# Patient Record
Sex: FEMALE | Race: WHITE | NOT HISPANIC OR LATINO | Employment: OTHER | URBAN - METROPOLITAN AREA
[De-identification: names, ages, dates, MRNs, and addresses within clinical notes are randomized per-mention and may not be internally consistent; named-entity substitution may affect disease eponyms.]

---

## 2019-05-20 ENCOUNTER — HOSPITAL ENCOUNTER (EMERGENCY)
Facility: HOSPITAL | Age: 79
Discharge: HOME | End: 2019-05-20
Attending: EMERGENCY MEDICINE
Payer: MEDICARE

## 2019-05-20 VITALS
SYSTOLIC BLOOD PRESSURE: 165 MMHG | HEIGHT: 67 IN | TEMPERATURE: 98 F | DIASTOLIC BLOOD PRESSURE: 82 MMHG | HEART RATE: 71 BPM | RESPIRATION RATE: 18 BRPM | BODY MASS INDEX: 23.23 KG/M2 | WEIGHT: 148 LBS | OXYGEN SATURATION: 99 %

## 2019-05-20 DIAGNOSIS — M54.9 BACK PAIN, UNSPECIFIED BACK LOCATION, UNSPECIFIED BACK PAIN LATERALITY, UNSPECIFIED CHRONICITY: ICD-10-CM

## 2019-05-20 DIAGNOSIS — K59.00 CONSTIPATION, UNSPECIFIED CONSTIPATION TYPE: ICD-10-CM

## 2019-05-20 DIAGNOSIS — R07.9 CHEST PAIN: ICD-10-CM

## 2019-05-20 DIAGNOSIS — R10.13 EPIGASTRIC PAIN: Primary | ICD-10-CM

## 2019-05-20 LAB
ALBUMIN SERPL BCP-MCNC: 3.7 G/DL (ref 3.5–5.2)
ALP SERPL-CCNC: 79 U/L (ref 55–135)
ALT SERPL W/O P-5'-P-CCNC: 11 U/L (ref 10–44)
ANION GAP SERPL CALC-SCNC: 7 MMOL/L (ref 8–16)
AST SERPL-CCNC: 14 U/L (ref 10–40)
BASOPHILS # BLD AUTO: 0.04 K/UL (ref 0–0.2)
BASOPHILS NFR BLD: 0.5 % (ref 0–1.9)
BILIRUB SERPL-MCNC: 0.5 MG/DL (ref 0.1–1)
BNP SERPL-MCNC: 97 PG/ML (ref 0–99)
BUN SERPL-MCNC: 11 MG/DL (ref 8–23)
CALCIUM SERPL-MCNC: 8.9 MG/DL (ref 8.7–10.5)
CHLORIDE SERPL-SCNC: 105 MMOL/L (ref 95–110)
CO2 SERPL-SCNC: 21 MMOL/L (ref 23–29)
CREAT SERPL-MCNC: 0.8 MG/DL (ref 0.5–1.4)
DIFFERENTIAL METHOD: ABNORMAL
EOSINOPHIL # BLD AUTO: 0.1 K/UL (ref 0–0.5)
EOSINOPHIL NFR BLD: 1.3 % (ref 0–8)
ERYTHROCYTE [DISTWIDTH] IN BLOOD BY AUTOMATED COUNT: 15.5 % (ref 11.5–14.5)
EST. GFR  (AFRICAN AMERICAN): >60 ML/MIN/1.73 M^2
EST. GFR  (NON AFRICAN AMERICAN): >60 ML/MIN/1.73 M^2
GLUCOSE SERPL-MCNC: 164 MG/DL (ref 70–110)
HCT VFR BLD AUTO: 39.4 % (ref 37–48.5)
HGB BLD-MCNC: 12.6 G/DL (ref 12–16)
IMM GRANULOCYTES # BLD AUTO: 0.04 K/UL (ref 0–0.04)
IMM GRANULOCYTES NFR BLD AUTO: 0.5 % (ref 0–0.5)
LYMPHOCYTES # BLD AUTO: 1.1 K/UL (ref 1–4.8)
LYMPHOCYTES NFR BLD: 12.7 % (ref 18–48)
MCH RBC QN AUTO: 27.3 PG (ref 27–31)
MCHC RBC AUTO-ENTMCNC: 32 G/DL (ref 32–36)
MCV RBC AUTO: 86 FL (ref 82–98)
MONOCYTES # BLD AUTO: 0.6 K/UL (ref 0.3–1)
MONOCYTES NFR BLD: 6.5 % (ref 4–15)
NEUTROPHILS # BLD AUTO: 6.6 K/UL (ref 1.8–7.7)
NEUTROPHILS NFR BLD: 78.5 % (ref 38–73)
NRBC BLD-RTO: 0 /100 WBC
PLATELET # BLD AUTO: 229 K/UL (ref 150–350)
PMV BLD AUTO: 10.9 FL (ref 9.2–12.9)
POTASSIUM SERPL-SCNC: 3.6 MMOL/L (ref 3.5–5.1)
PROT SERPL-MCNC: 7.4 G/DL (ref 6–8.4)
RBC # BLD AUTO: 4.61 M/UL (ref 4–5.4)
SODIUM SERPL-SCNC: 133 MMOL/L (ref 136–145)
TROPONIN I SERPL DL<=0.01 NG/ML-MCNC: <0.01 NG/ML (ref 0.02–0.5)
WBC # BLD AUTO: 8.44 K/UL (ref 3.9–12.7)

## 2019-05-20 PROCEDURE — 85025 COMPLETE CBC W/AUTO DIFF WBC: CPT

## 2019-05-20 PROCEDURE — 71045 XR CHEST 1 VIEW: ICD-10-PCS | Mod: 26,,, | Performed by: RADIOLOGY

## 2019-05-20 PROCEDURE — 71045 X-RAY EXAM CHEST 1 VIEW: CPT | Mod: TC,FY

## 2019-05-20 PROCEDURE — 80053 COMPREHEN METABOLIC PANEL: CPT

## 2019-05-20 PROCEDURE — 93005 ELECTROCARDIOGRAM TRACING: CPT

## 2019-05-20 PROCEDURE — 84484 ASSAY OF TROPONIN QUANT: CPT

## 2019-05-20 PROCEDURE — 71045 X-RAY EXAM CHEST 1 VIEW: CPT | Mod: 26,,, | Performed by: RADIOLOGY

## 2019-05-20 PROCEDURE — 99285 EMERGENCY DEPT VISIT HI MDM: CPT | Mod: 25

## 2019-05-20 PROCEDURE — 83880 ASSAY OF NATRIURETIC PEPTIDE: CPT

## 2019-05-20 RX ORDER — ASPIRIN 325 MG
325 TABLET ORAL
Status: DISCONTINUED | OUTPATIENT
Start: 2019-05-20 | End: 2019-05-20

## 2019-05-20 RX ORDER — ROSUVASTATIN CALCIUM 5 MG/1
5 TABLET, COATED ORAL EVERY OTHER DAY
COMMUNITY

## 2019-05-20 RX ORDER — NITROFURANTOIN MACROCRYSTALS 50 MG/1
50 CAPSULE ORAL EVERY OTHER DAY
COMMUNITY
End: 2023-05-15

## 2019-05-20 RX ORDER — METOPROLOL SUCCINATE 50 MG/1
50 TABLET, EXTENDED RELEASE ORAL DAILY
COMMUNITY

## 2019-05-20 RX ORDER — CHOLECALCIFEROL (VITAMIN D3) 25 MCG
2000 TABLET ORAL DAILY
COMMUNITY

## 2019-05-20 NOTE — ED PROVIDER NOTES
"Encounter Date: 5/20/2019       History     Chief Complaint   Patient presents with    Chest Pain     79yo female with pmh known/reported RBBB, HTN, and HLD presents to ED for evaluation of intermittent chest pain that is located "just under my boobs" and radiating to the mid-back that began around 2am. Reports she took Tums with no relief so then took two 81mg ASA. Does report history of constipation with last bowel movement approximately 2-3 days ago. Denies fever, chills, dyspnea, palpitations, edema.         Review of patient's allergies indicates:   Allergen Reactions    Pcn [penicillins]     Prednisone (bulk)     Sulfa (sulfonamide antibiotics)      No past medical history on file.  No past surgical history on file.  No family history on file.  Social History     Tobacco Use    Smoking status: Not on file   Substance Use Topics    Alcohol use: Not on file    Drug use: Not on file     Review of Systems   Constitutional: Negative for chills, diaphoresis, fatigue and fever.   HENT: Negative for congestion, ear pain, facial swelling, rhinorrhea, sinus pressure, sinus pain, sore throat and tinnitus.    Eyes: Negative for photophobia and visual disturbance.   Respiratory: Negative for cough, chest tightness, shortness of breath and wheezing.    Cardiovascular: Positive for chest pain. Negative for palpitations and leg swelling.   Gastrointestinal: Negative for abdominal distention, abdominal pain, blood in stool, constipation, diarrhea, nausea and vomiting.   Endocrine: Negative for cold intolerance, heat intolerance, polydipsia, polyphagia and polyuria.   Genitourinary: Negative for decreased urine volume, difficulty urinating, dysuria, flank pain, frequency, hematuria, pelvic pain and urgency.   Musculoskeletal: Positive for back pain. Negative for arthralgias, gait problem, joint swelling, myalgias, neck pain and neck stiffness.   Skin: Negative for color change, pallor, rash and wound. "   Allergic/Immunologic: Negative for immunocompromised state.   Neurological: Negative for dizziness, syncope, weakness, light-headedness, numbness and headaches.   Hematological: Negative for adenopathy. Does not bruise/bleed easily.   Psychiatric/Behavioral: Negative for agitation, confusion and dysphoric mood.   All other systems reviewed and are negative.      Physical Exam     Initial Vitals [05/20/19 0600]   BP Pulse Resp Temp SpO2   (!) 165/82 71 18 98.1 °F (36.7 °C) 99 %      MAP       --         Physical Exam    Nursing note and vitals reviewed.  Constitutional: She appears well-developed and well-nourished. She is not diaphoretic. No distress.   HENT:   Head: Normocephalic and atraumatic.   Right Ear: External ear normal.   Left Ear: External ear normal.   Nose: Nose normal.   Mouth/Throat: Oropharynx is clear and moist.   Eyes: Conjunctivae are normal. Pupils are equal, round, and reactive to light. No scleral icterus.   Neck: Normal range of motion. Neck supple. No JVD present.   Cardiovascular: Normal rate, regular rhythm, normal heart sounds and intact distal pulses.   Pulmonary/Chest: Breath sounds normal. No respiratory distress. She has no wheezes. She has no rhonchi. She has no rales. She exhibits tenderness.   Abdominal: Soft. Bowel sounds are normal. She exhibits no distension. There is no tenderness. There is no rebound and no guarding.   Musculoskeletal: Normal range of motion. She exhibits no edema or tenderness.   Lymphadenopathy:     She has no cervical adenopathy.   Neurological: She is alert and oriented to person, place, and time. GCS score is 15. GCS eye subscore is 4. GCS verbal subscore is 5. GCS motor subscore is 6.   Skin: Skin is warm and dry. Capillary refill takes less than 2 seconds. No rash noted. No erythema.   Psychiatric: She has a normal mood and affect. Her behavior is normal. Judgment and thought content normal.         ED Course   Procedures  Labs Reviewed   CBC W/ AUTO  DIFFERENTIAL - Abnormal; Notable for the following components:       Result Value    RDW 15.5 (*)     Gran% 78.5 (*)     Lymph% 12.7 (*)     All other components within normal limits   COMPREHENSIVE METABOLIC PANEL - Abnormal; Notable for the following components:    Sodium 133 (*)     CO2 21 (*)     Glucose 164 (*)     Anion Gap 7 (*)     All other components within normal limits   TROPONIN I   B-TYPE NATRIURETIC PEPTIDE     EKG Readings: (Independently Interpreted)   Initial Reading: No STEMI. Rhythm: Normal Sinus Rhythm. Heart Rate: 73. Ectopy: No Ectopy. Conduction: RBBB. ST Segments: Normal ST Segments. T Waves: Normal. Axis: Normal. Clinical Impression: Normal Sinus Rhythm with RBBB       Imaging Results          X-Ray Chest 1 View (Final result)  Result time 05/20/19 07:51:48    Final result by Reynaldo Wilson MD (05/20/19 07:51:48)                 Impression:      1. No acute chest disease.  2. Cardiomegaly.      Electronically signed by: Reynaldo Wilson  Date:    05/20/2019  Time:    07:51             Narrative:    EXAMINATION:  XR CHEST 1 VIEW    CLINICAL HISTORY:  . Chest pain, unspecified    TECHNIQUE:  Single frontal portable view of the chest was performed.    COMPARISON:  None    FINDINGS:  Support devices: None    The lungs are clear, with normal appearance of pulmonary vasculature and no pleural effusion or pneumothorax.    The cardiac silhouette is enlarged.  The hilar and mediastinal contours are unremarkable.    Bones are intact.                              X-Rays:   Independently Interpreted Readings:   Chest X-Ray: No infiltrates.  No acute abnormalities. Cardiomegaly present.     Medical Decision Making:   Differential Diagnosis:   Myocardial infarction, pneumonia, pleurisy, costochondritis, GERD, gastritis  ED Management:  Cardiac workup negative, known RBBB seen on EKG. Pt is from out [facility] - and is returning today with her , requesting discharge to follow up with her  pcp.                       Clinical Impression:       ICD-10-CM ICD-9-CM   1. Epigastric pain R10.13 789.06   2. Chest pain R07.9 786.50   3. Back pain, unspecified back location, unspecified back pain laterality, unspecified chronicity M54.9 724.5   4. Constipation, unspecified constipation type K59.00 564.00         Disposition:   Disposition: Discharged  Condition: Stable                        Kelly Rojas MD  05/25/19 6809

## 2020-10-07 ENCOUNTER — HOSPITAL ENCOUNTER (EMERGENCY)
Facility: HOSPITAL | Age: 80
Discharge: HOME | End: 2020-10-08
Attending: FAMILY MEDICINE
Payer: MEDICARE

## 2020-10-07 DIAGNOSIS — R10.13 EPIGASTRIC ABDOMINAL PAIN: Primary | ICD-10-CM

## 2020-10-07 DIAGNOSIS — R10.13 EPIGASTRIC PAIN: ICD-10-CM

## 2020-10-07 LAB — POCT GLUCOSE: 169 MG/DL (ref 70–110)

## 2020-10-07 PROCEDURE — 83735 ASSAY OF MAGNESIUM: CPT

## 2020-10-07 PROCEDURE — 80053 COMPREHEN METABOLIC PANEL: CPT

## 2020-10-07 PROCEDURE — 82550 ASSAY OF CK (CPK): CPT

## 2020-10-07 PROCEDURE — 82553 CREATINE MB FRACTION: CPT

## 2020-10-07 PROCEDURE — 83690 ASSAY OF LIPASE: CPT

## 2020-10-07 PROCEDURE — 85025 COMPLETE CBC W/AUTO DIFF WBC: CPT

## 2020-10-07 PROCEDURE — 99285 EMERGENCY DEPT VISIT HI MDM: CPT | Mod: 25

## 2020-10-07 PROCEDURE — 82962 GLUCOSE BLOOD TEST: CPT

## 2020-10-07 PROCEDURE — 84484 ASSAY OF TROPONIN QUANT: CPT

## 2020-10-07 PROCEDURE — 93005 ELECTROCARDIOGRAM TRACING: CPT

## 2020-10-07 RX ORDER — FAMOTIDINE 20 MG/1
20 TABLET, FILM COATED ORAL 2 TIMES DAILY
COMMUNITY

## 2020-10-08 VITALS
BODY MASS INDEX: 23.16 KG/M2 | RESPIRATION RATE: 20 BRPM | HEIGHT: 65 IN | WEIGHT: 139 LBS | DIASTOLIC BLOOD PRESSURE: 77 MMHG | OXYGEN SATURATION: 100 % | HEART RATE: 73 BPM | TEMPERATURE: 99 F | SYSTOLIC BLOOD PRESSURE: 149 MMHG

## 2020-10-08 LAB
ALBUMIN SERPL BCP-MCNC: 3.4 G/DL (ref 3.5–5.2)
ALP SERPL-CCNC: 69 U/L (ref 55–135)
ALT SERPL W/O P-5'-P-CCNC: 11 U/L (ref 10–44)
ANION GAP SERPL CALC-SCNC: 11 MMOL/L (ref 8–16)
AST SERPL-CCNC: 12 U/L (ref 10–40)
BASOPHILS # BLD AUTO: 0.04 K/UL (ref 0–0.2)
BASOPHILS NFR BLD: 0.5 % (ref 0–1.9)
BILIRUB SERPL-MCNC: 0.4 MG/DL (ref 0.1–1)
BUN SERPL-MCNC: 16 MG/DL (ref 8–23)
CALCIUM SERPL-MCNC: 9.1 MG/DL (ref 8.7–10.5)
CHLORIDE SERPL-SCNC: 101 MMOL/L (ref 95–110)
CK MB SERPL-MCNC: 0.6 NG/ML (ref 0.1–6.5)
CK MB SERPL-RTO: 2.1 % (ref 0–5)
CK SERPL-CCNC: 29 U/L (ref 20–180)
CK SERPL-CCNC: 29 U/L (ref 20–180)
CO2 SERPL-SCNC: 24 MMOL/L (ref 23–29)
CREAT SERPL-MCNC: 0.8 MG/DL (ref 0.5–1.4)
DIFFERENTIAL METHOD: ABNORMAL
EOSINOPHIL # BLD AUTO: 0.1 K/UL (ref 0–0.5)
EOSINOPHIL NFR BLD: 1.5 % (ref 0–8)
ERYTHROCYTE [DISTWIDTH] IN BLOOD BY AUTOMATED COUNT: 14.9 % (ref 11.5–14.5)
EST. GFR  (AFRICAN AMERICAN): >60 ML/MIN/1.73 M^2
EST. GFR  (NON AFRICAN AMERICAN): >60 ML/MIN/1.73 M^2
GLUCOSE SERPL-MCNC: 144 MG/DL (ref 70–110)
HCT VFR BLD AUTO: 37.4 % (ref 37–48.5)
HGB BLD-MCNC: 11.8 G/DL (ref 12–16)
IMM GRANULOCYTES # BLD AUTO: 0.03 K/UL (ref 0–0.04)
IMM GRANULOCYTES NFR BLD AUTO: 0.3 % (ref 0–0.5)
LIPASE SERPL-CCNC: 56 U/L (ref 4–60)
LYMPHOCYTES # BLD AUTO: 1.7 K/UL (ref 1–4.8)
LYMPHOCYTES NFR BLD: 19.3 % (ref 18–48)
MAGNESIUM SERPL-MCNC: 1.8 MG/DL (ref 1.6–2.6)
MCH RBC QN AUTO: 27 PG (ref 27–31)
MCHC RBC AUTO-ENTMCNC: 31.6 G/DL (ref 32–36)
MCV RBC AUTO: 86 FL (ref 82–98)
MONOCYTES # BLD AUTO: 0.7 K/UL (ref 0.3–1)
MONOCYTES NFR BLD: 8 % (ref 4–15)
NEUTROPHILS # BLD AUTO: 6.3 K/UL (ref 1.8–7.7)
NEUTROPHILS NFR BLD: 70.4 % (ref 38–73)
NRBC BLD-RTO: 0 /100 WBC
PLATELET # BLD AUTO: 238 K/UL (ref 150–350)
PMV BLD AUTO: 11.3 FL (ref 9.2–12.9)
POTASSIUM SERPL-SCNC: 4 MMOL/L (ref 3.5–5.1)
PROT SERPL-MCNC: 7.3 G/DL (ref 6–8.4)
RBC # BLD AUTO: 4.37 M/UL (ref 4–5.4)
SODIUM SERPL-SCNC: 136 MMOL/L (ref 136–145)
TROPONIN I SERPL DL<=0.01 NG/ML-MCNC: 0.01 NG/ML (ref 0.02–0.5)
WBC # BLD AUTO: 8.88 K/UL (ref 3.9–12.7)

## 2020-10-08 PROCEDURE — 25000003 PHARM REV CODE 250: Performed by: FAMILY MEDICINE

## 2020-10-08 RX ORDER — DICYCLOMINE HYDROCHLORIDE 10 MG/1
20 CAPSULE ORAL
Status: COMPLETED | OUTPATIENT
Start: 2020-10-08 | End: 2020-10-08

## 2020-10-08 RX ORDER — HYOSCYAMINE SULFATE 0.12 MG/1
0.12 TABLET SUBLINGUAL EVERY 4 HOURS PRN
Qty: 30 TABLET | Refills: 0 | OUTPATIENT
Start: 2020-10-08 | End: 2023-07-04

## 2020-10-08 RX ADMIN — DICYCLOMINE HYDROCHLORIDE 20 MG: 10 CAPSULE ORAL at 01:10

## 2020-10-08 NOTE — ED PROVIDER NOTES
Encounter Date: 10/7/2020       History     Chief Complaint   Patient presents with    Abdominal Pain     epigastric pain that started after dinner per patient. patient reports pain radiates to her back.      Patient comes our facility complaining of some epigastric pain.  Patient stated that about 6:00 p.m. patient started having this pain.  It started about 1 hr after dinner.  Patient has some food with some red gravy in thinks the food might have upset her stomach.  The patient took 3 different times, 1 Pepcid, into 81 mg aspirin tabs had an effort to relieve her pain.  Patient had minimal to no relief and came to the ER.  Patient denies any associated shortness of breath, cough, fevers, chills, nausea, vomiting, diarrhea.  Patient had a normal bowel movement earlier today.  Patient denies any palpitations, diaphoresis or chest pains.  Patient denies any burning sensation or history of reflux in the past.        Review of patient's allergies indicates:   Allergen Reactions    Pcn [penicillins]     Prednisone (bulk)     Sulfa (sulfonamide antibiotics)      Past Medical History:   Diagnosis Date    Diabetes mellitus     PVC (premature ventricular contraction)      Past Surgical History:   Procedure Laterality Date    BREAST BIOPSY      HYSTERECTOMY       History reviewed. No pertinent family history.  Social History     Tobacco Use    Smoking status: Never Smoker    Smokeless tobacco: Never Used   Substance Use Topics    Alcohol use: Yes     Alcohol/week: 2.0 standard drinks     Types: 2 Glasses of wine per week     Comment: occasional     Drug use: Never     Review of Systems   Constitutional: Negative for chills and fever.   HENT: Negative for sore throat.    Respiratory: Negative for cough, shortness of breath and wheezing.    Cardiovascular: Negative for chest pain, palpitations and leg swelling.   Gastrointestinal: Positive for abdominal pain. Negative for abdominal distention, blood in stool,  constipation, diarrhea, nausea and vomiting.   Genitourinary: Negative for dysuria, flank pain and frequency.   Musculoskeletal: Negative for arthralgias and myalgias.   Skin: Negative for color change, pallor, rash and wound.   Neurological: Negative for dizziness, weakness, light-headedness and headaches.   Hematological: Negative for adenopathy. Does not bruise/bleed easily.   Psychiatric/Behavioral: Negative for agitation, behavioral problems and confusion.       Physical Exam     Initial Vitals [10/07/20 2315]   BP Pulse Resp Temp SpO2   (!) 157/78 77 18 98.6 °F (37 °C) 98 %      MAP       --         Physical Exam    Nursing note and vitals reviewed.  Constitutional: She appears well-developed and well-nourished. She is not diaphoretic. No distress.   HENT:   Head: Normocephalic and atraumatic.   Nose: Nose normal.   Eyes: Conjunctivae and EOM are normal. Right eye exhibits no discharge. Left eye exhibits no discharge. No scleral icterus.   Neck: Normal range of motion. Neck supple.   Cardiovascular: Normal rate, regular rhythm, normal heart sounds and intact distal pulses.   No murmur heard.  Pulmonary/Chest: Breath sounds normal. No respiratory distress. She has no wheezes. She has no rhonchi. She has no rales. She exhibits no tenderness.   Abdominal: Soft. Bowel sounds are normal. She exhibits no distension and no mass. There is no abdominal tenderness. There is no rebound.   Musculoskeletal: Normal range of motion. No edema.   Lymphadenopathy:     She has no cervical adenopathy.   Neurological: She is alert and oriented to person, place, and time. GCS score is 15. GCS eye subscore is 4. GCS verbal subscore is 5. GCS motor subscore is 6.   Skin: Skin is warm and dry. Capillary refill takes less than 2 seconds. No rash and no abscess noted. No erythema. No pallor.   Psychiatric: She has a normal mood and affect. Her behavior is normal. Judgment and thought content normal.         ED Course   Procedures  Labs  Reviewed   CBC W/ AUTO DIFFERENTIAL - Abnormal; Notable for the following components:       Result Value    Hemoglobin 11.8 (*)     Mean Corpuscular Hemoglobin Conc 31.6 (*)     RDW 14.9 (*)     All other components within normal limits   COMPREHENSIVE METABOLIC PANEL - Abnormal; Notable for the following components:    Glucose 144 (*)     Albumin 3.4 (*)     All other components within normal limits   TROPONIN I - Abnormal; Notable for the following components:    Troponin I 0.01 (*)     All other components within normal limits   POCT GLUCOSE - Abnormal; Notable for the following components:    POCT Glucose 169 (*)     All other components within normal limits   MAGNESIUM   CK   CK-MB   LIPASE   POCT GLUCOSE MONITORING CONTINUOUS          Imaging Results          XR ABDOMEN, ACUTE 2 OR MORE VIEWS WITH CHEST (In process)               X-Rays:   Independently Interpreted Readings:   Other Readings:  Flattened upright abdominal x-ray shows no acute findings.  Normal gas pattern with stool noted in large intestines.  Chest x-ray shows no acute findings.                                Clinical Impression:     1- epigastric abdominal pain , resolved     Disposition:   Disposition: Discharged  Condition: Stable                          Shoaib Varela MD  10/08/20 0113

## 2020-10-08 NOTE — DISCHARGE INSTRUCTIONS
Follow-up with her primary care physician as needed.  If his symptoms return or not control with treatment he may return to ER for secondary evaluation.

## 2020-10-08 NOTE — ED TRIAGE NOTES
"Patient ambulatory with steady gait unassisted to ER#3 accompanied by spouse. Patient complains of epigastric pain that started after she ate dinner around 1830. Patient reports pain radiates to her back described as constant "gassy" feeling. Rates pain 6/10. Respirations even and unlabored. No acute distress noted. Denies any shortness of breath, nausea, vomiting, or diarrhea. Denies diaphoresis with onset of epigastric pain. Last bowel movement today 10/07/2020.  Patient reports she took two 81mg Aspirin and three tums prior to arrival.   "

## 2020-10-08 NOTE — ED NOTES
Patient and patient's spouse updated on plan of care. No questions or concerns at this time. Will continue to monitor.

## 2022-12-23 ENCOUNTER — OFFICE VISIT (OUTPATIENT)
Dept: URGENT CARE | Facility: CLINIC | Age: 82
End: 2022-12-23
Payer: MEDICARE

## 2022-12-23 VITALS
HEIGHT: 65 IN | HEART RATE: 70 BPM | OXYGEN SATURATION: 98 % | WEIGHT: 140 LBS | SYSTOLIC BLOOD PRESSURE: 112 MMHG | TEMPERATURE: 98 F | DIASTOLIC BLOOD PRESSURE: 62 MMHG | BODY MASS INDEX: 23.32 KG/M2

## 2022-12-23 DIAGNOSIS — U07.1 COVID-19 VIRUS DETECTED: ICD-10-CM

## 2022-12-23 DIAGNOSIS — U07.1 COVID: ICD-10-CM

## 2022-12-23 DIAGNOSIS — R05.9 COUGH, UNSPECIFIED TYPE: Primary | ICD-10-CM

## 2022-12-23 LAB
CTP QC/QA: YES
SARS-COV-2 AG RESP QL IA.RAPID: POSITIVE

## 2022-12-23 PROCEDURE — 87811 SARS CORONAVIRUS 2 ANTIGEN POCT, MANUAL READ: ICD-10-PCS | Mod: QW,CR,S$GLB, | Performed by: NURSE PRACTITIONER

## 2022-12-23 PROCEDURE — 99203 OFFICE O/P NEW LOW 30 MIN: CPT | Mod: CR,S$GLB,, | Performed by: NURSE PRACTITIONER

## 2022-12-23 PROCEDURE — 87811 SARS-COV-2 COVID19 W/OPTIC: CPT | Mod: QW,CR,S$GLB, | Performed by: NURSE PRACTITIONER

## 2022-12-23 PROCEDURE — 99203 PR OFFICE/OUTPT VISIT, NEW, LEVL III, 30-44 MIN: ICD-10-PCS | Mod: CR,S$GLB,, | Performed by: NURSE PRACTITIONER

## 2022-12-23 RX ORDER — PROMETHAZINE HYDROCHLORIDE AND DEXTROMETHORPHAN HYDROBROMIDE 6.25; 15 MG/5ML; MG/5ML
5 SYRUP ORAL EVERY 4 HOURS PRN
Qty: 120 ML | Refills: 0 | Status: SHIPPED | OUTPATIENT
Start: 2022-12-23 | End: 2023-01-02

## 2022-12-23 NOTE — PATIENT INSTRUCTIONS
You must understand that you've received an Urgent Care treatment only and that you may be released before all your medical problems are known or treated. You, the patient, will arrange for follow up care as instructed.  Follow up with your PCP or specialty clinic as directed in the next 1-2 weeks if not improved or as needed.  You can call [phone] to schedule an appointment with the appropriate provider.  If your condition worsens we recommend that you receive another evaluation at the emergency room immediately or contact your primary medical clinics after hours call service to discuss your concerns.  Please return here or go to the Emergency Department for any concerns or worsening of condition.    If you were prescribed a narcotic or controlled medication, do not drive or operate heavy equipment or machinery while taking these medications.    Your test was POSITIVE for COVID-19 (coronavirus).       Please isolate yourself at home.  You may leave home and/or return to work once the following conditions are met:    If you were not hospitalized and are not moderately to severely immunocompromised:   More than 5 days since symptoms first appeared AND  More than 24 hours fever free without medications AND  Symptoms are improving  Continue to wear a mask around others for 5 additional days.    If you were hospitalized OR are moderately to severely immunocompromised:  More than 20 days since symptoms first appeared  More than 24 hours fever free without medications  Symptoms have improved    If you had no symptoms but tested positive:  More than 5 days since the date of the first positive test (20 days if moderately to severely immunocompromised). If you develop symptoms, then use the guidelines above.  Continue to wear a mask around others for 5 additional days.      Contact Tracing    As one of the next steps, you will receive a call or text from the [place] Department of Health ([facility]) COVID-19 Tracing  Team. See the contact information below so you know not to ignore the health departments call. It is important that you contact them back immediately so they can help.      Contact Tracer Number:  [phone]  Caller ID for most carriers: [facility]     What is contact tracing?  Contact tracing is a process that helps identify everyone who has been in close contact with an infected person. Contact tracers let those people know they may have been exposed and guide them on next steps. Confidentiality is important for everyone; no one will be told who may have exposed them to the virus.  Your involvement is important. The more we know about where and how this virus is spreading, the better chance we have at stopping it from spreading further.  What does exposure mean?  Exposure means you have been within 6 feet for more than 15 minutes with a person who has or had COVID-19.  What kind of questions do the contact tracers ask?  A contact tracer will confirm your basic contact information including name, address, phone number, and next of kin, as well as asking about any symptoms you may have had. Theyll also ask you how you think you may have gotten sick, such as places where you may have been exposed to the virus, and people you were with. Those names will never be shared with anyone outside of that call, and will only be used to help trace and stop the spread of the virus.   I have privacy concerns. How will the state use my information?  Your privacy about your health is important. All calls are completed using call centers that use the appropriate health privacy protection measures (HIPAA compliance), meaning that your patient information is safe. No one will ever ask you any questions related to immigration status. Your health comes first.   Do I have to participate?  You do not have to participate, but we strongly encourage you to. Contact tracing can help us catch and control new outbreaks as theyre  developing to keep your friends and family safe.   What if I dont hear from anyone?  If you dont receive a call within 24 hours, you can call the number above right away to inquire about your status. That line is open from 8:00 am - 8:00 p.m., 7 days a week.  Contact tracing saves lives! Together, we have the power to beat this virus and keep our loved ones and neighbors safe.    For more information see CDC link below.      https://www.cdc.gov/coronavirus/2019-ncov/hcp/guidance-prevent-spread.html#precautions        Sources:  CDC, [place] Department [facility] and [facility]           Sincerely,     FARIHA Burt

## 2022-12-23 NOTE — PROGRESS NOTES
"  Subjective:       Patient ID: Stacie Hudson is a 82 y.o. female.    Vitals:  height is 5' 5" (1.651 m) and weight is 63.5 kg (140 lb). Her oral temperature is 98.4 °F (36.9 °C). Her blood pressure is 112/62 and her pulse is 70. Her oxygen saturation is 98%.     Chief Complaint: Cough    This is a 82 y.o. female who presents today with a chief complaint of  Patient presents with:  Cough and sinus congestion since yesterday.         Cough  This is a new problem. The current episode started yesterday. The problem has been gradually worsening. The problem occurs constantly. Associated symptoms include nasal congestion. She has tried nothing for the symptoms. The treatment provided no relief.     Respiratory:  Positive for cough.          Objective:      Physical Exam   Constitutional: She is oriented to person, place, and time. She appears well-developed. She is cooperative.  Non-toxic appearance. She does not appear ill. No distress.   HENT:   Head: Normocephalic and atraumatic.   Ears:   Right Ear: Hearing, tympanic membrane, external ear and ear canal normal.   Left Ear: Hearing, tympanic membrane, external ear and ear canal normal.   Nose: Purulent discharge present. No mucosal edema, rhinorrhea or nasal deformity. No epistaxis. Right sinus exhibits no maxillary sinus tenderness and no frontal sinus tenderness. Left sinus exhibits no maxillary sinus tenderness and no frontal sinus tenderness.   Mouth/Throat: Uvula is midline and mucous membranes are normal. No trismus in the jaw. Normal dentition. No uvula swelling. Posterior oropharyngeal edema and posterior oropharyngeal erythema present. No oropharyngeal exudate.   Eyes: Conjunctivae and lids are normal. No scleral icterus.   Neck: Trachea normal and phonation normal. Neck supple. No edema present. No erythema present. No neck rigidity present.   Cardiovascular: Normal rate, regular rhythm, normal heart sounds and normal pulses.   Pulmonary/Chest: Effort normal " and breath sounds normal. No respiratory distress. She has no decreased breath sounds. She has no rhonchi.   Abdominal: Normal appearance.   Musculoskeletal: Normal range of motion.         General: No deformity. Normal range of motion.   Neurological: She is alert and oriented to person, place, and time. She exhibits normal muscle tone. Coordination normal.   Skin: Skin is warm, dry, intact, not diaphoretic and not pale.   Psychiatric: Her speech is normal and behavior is normal. Judgment and thought content normal.   Nursing note and vitals reviewed.      Past medical history and current medications reviewed.     No disrtress but eill cover related to med history.   Results for orders placed or performed in visit on 12/23/22   SARS Coronavirus 2 Antigen, POCT Manual Read   Result Value Ref Range    SARS Coronavirus 2 Antigen Positive (A) Negative     Acceptable Yes        Assessment:           1. Cough, unspecified type    2. COVID              Plan:         Cough, unspecified type  -     SARS Coronavirus 2 Antigen, POCT Manual Read  -     promethazine-dextromethorphan (PROMETHAZINE-DM) 6.25-15 mg/5 mL Syrp; Take 5 mLs by mouth every 4 (four) hours as needed.  Dispense: 120 mL; Refill: 0  -     nirmatrelvir-ritonavir 300 mg (150 mg x 2)-100 mg copackaged tablets (EUA); Take 3 tablets by mouth 2 (two) times daily for 5 days. Each dose contains 2 nirmatrelvir (pink tablets) and 1 ritonavir (white tablet). Take all 3 tablets together  Dispense: 30 tablet; Refill: 0    COVID  -     promethazine-dextromethorphan (PROMETHAZINE-DM) 6.25-15 mg/5 mL Syrp; Take 5 mLs by mouth every 4 (four) hours as needed.  Dispense: 120 mL; Refill: 0  -     nirmatrelvir-ritonavir 300 mg (150 mg x 2)-100 mg copackaged tablets (EUA); Take 3 tablets by mouth 2 (two) times daily for 5 days. Each dose contains 2 nirmatrelvir (pink tablets) and 1 ritonavir (white tablet). Take all 3 tablets together  Dispense: 30 tablet; Refill:  0             Patient Instructions     You must understand that you've received an Urgent Care treatment only and that you may be released before all your medical problems are known or treated. You, the patient, will arrange for follow up care as instructed.  Follow up with your PCP or specialty clinic as directed in the next 1-2 weeks if not improved or as needed.  You can call [phone] to schedule an appointment with the appropriate provider.  If your condition worsens we recommend that you receive another evaluation at the emergency room immediately or contact your primary medical clinics after hours call service to discuss your concerns.  Please return here or go to the Emergency Department for any concerns or worsening of condition.    If you were prescribed a narcotic or controlled medication, do not drive or operate heavy equipment or machinery while taking these medications.    Your test was POSITIVE for COVID-19 (coronavirus).       Please isolate yourself at home.  You may leave home and/or return to work once the following conditions are met:    If you were not hospitalized and are not moderately to severely immunocompromised:   More than 5 days since symptoms first appeared AND  More than 24 hours fever free without medications AND  Symptoms are improving  Continue to wear a mask around others for 5 additional days.    If you were hospitalized OR are moderately to severely immunocompromised:  More than 20 days since symptoms first appeared  More than 24 hours fever free without medications  Symptoms have improved    If you had no symptoms but tested positive:  More than 5 days since the date of the first positive test (20 days if moderately to severely immunocompromised). If you develop symptoms, then use the guidelines above.  Continue to wear a mask around others for 5 additional days.      Contact Tracing    As one of the next steps, you will receive a call or text from the [facility]  of Health ([facility]) COVID-19 Tracing Team. See the contact information below so you know not to ignore the health departments call. It is important that you contact them back immediately so they can help.      Contact Tracer Number:  [phone]  Caller ID for most carriers: [facility]     What is contact tracing?  Contact tracing is a process that helps identify everyone who has been in close contact with an infected person. Contact tracers let those people know they may have been exposed and guide them on next steps. Confidentiality is important for everyone; no one will be told who may have exposed them to the virus.  Your involvement is important. The more we know about where and how this virus is spreading, the better chance we have at stopping it from spreading further.  What does exposure mean?  Exposure means you have been within 6 feet for more than 15 minutes with a person who has or had COVID-19.  What kind of questions do the contact tracers ask?  A contact tracer will confirm your basic contact information including name, address, phone number, and next of kin, as well as asking about any symptoms you may have had. Theyll also ask you how you think you may have gotten sick, such as places where you may have been exposed to the virus, and people you were with. Those names will never be shared with anyone outside of that call, and will only be used to help trace and stop the spread of the virus.   I have privacy concerns. How will the state use my information?  Your privacy about your health is important. All calls are completed using call centers that use the appropriate health privacy protection measures (HIPAA compliance), meaning that your patient information is safe. No one will ever ask you any questions related to immigration status. Your health comes first.   Do I have to participate?  You do not have to participate, but we strongly encourage you to. Contact tracing can help us catch and  control new outbreaks as theyre developing to keep your friends and family safe.   What if I dont hear from anyone?  If you dont receive a call within 24 hours, you can call the number above right away to inquire about your status. That line is open from 8:00 am - 8:00 p.m., 7 days a week.  Contact tracing saves lives! Together, we have the power to beat this virus and keep our loved ones and neighbors safe.    For more information see CDC link below.      https://www.cdc.gov/coronavirus/2019-ncov/hcp/guidance-prevent-spread.html#precautions        Sources:  [facility], [place] Department of Health and [facility]ists of hospitals in the [place]           Sincerely,     FARIHA Burt

## 2023-05-02 ENCOUNTER — HOSPITAL ENCOUNTER (EMERGENCY)
Facility: HOSPITAL | Age: 83
Discharge: HOME | End: 2023-05-03
Attending: FAMILY MEDICINE
Payer: MEDICARE

## 2023-05-02 DIAGNOSIS — R10.13 EPIGASTRIC DISCOMFORT: ICD-10-CM

## 2023-05-02 DIAGNOSIS — K80.20 SYMPTOMATIC CHOLELITHIASIS: ICD-10-CM

## 2023-05-02 DIAGNOSIS — K59.00 CONSTIPATION, UNSPECIFIED CONSTIPATION TYPE: ICD-10-CM

## 2023-05-02 DIAGNOSIS — N39.0 URINARY TRACT INFECTION WITHOUT HEMATURIA, SITE UNSPECIFIED: Primary | ICD-10-CM

## 2023-05-02 LAB
BASOPHILS # BLD AUTO: 0.03 K/UL (ref 0–0.2)
BASOPHILS NFR BLD: 0.3 % (ref 0–1.9)
DIFFERENTIAL METHOD: ABNORMAL
EOSINOPHIL # BLD AUTO: 0.1 K/UL (ref 0–0.5)
EOSINOPHIL NFR BLD: 1.4 % (ref 0–8)
ERYTHROCYTE [DISTWIDTH] IN BLOOD BY AUTOMATED COUNT: 15.8 % (ref 11.5–14.5)
HCT VFR BLD AUTO: 35.9 % (ref 37–48.5)
HGB BLD-MCNC: 11.8 G/DL (ref 12–16)
IMM GRANULOCYTES # BLD AUTO: 0.03 K/UL (ref 0–0.04)
IMM GRANULOCYTES NFR BLD AUTO: 0.3 % (ref 0–0.5)
LYMPHOCYTES # BLD AUTO: 1.6 K/UL (ref 1–4.8)
LYMPHOCYTES NFR BLD: 16.6 % (ref 18–48)
MCH RBC QN AUTO: 27.1 PG (ref 27–31)
MCHC RBC AUTO-ENTMCNC: 32.9 G/DL (ref 32–36)
MCV RBC AUTO: 82 FL (ref 82–98)
MONOCYTES # BLD AUTO: 0.9 K/UL (ref 0.3–1)
MONOCYTES NFR BLD: 9.1 % (ref 4–15)
NEUTROPHILS # BLD AUTO: 6.8 K/UL (ref 1.8–7.7)
NEUTROPHILS NFR BLD: 72.3 % (ref 38–73)
NRBC BLD-RTO: 0 /100 WBC
PLATELET # BLD AUTO: 245 K/UL (ref 150–450)
PMV BLD AUTO: 10.5 FL (ref 9.2–12.9)
RBC # BLD AUTO: 4.36 M/UL (ref 4–5.4)
WBC # BLD AUTO: 9.43 K/UL (ref 3.9–12.7)

## 2023-05-02 PROCEDURE — 85025 COMPLETE CBC W/AUTO DIFF WBC: CPT | Performed by: FAMILY MEDICINE

## 2023-05-02 PROCEDURE — 83690 ASSAY OF LIPASE: CPT | Performed by: FAMILY MEDICINE

## 2023-05-02 PROCEDURE — 93005 ELECTROCARDIOGRAM TRACING: CPT

## 2023-05-02 PROCEDURE — 93010 ELECTROCARDIOGRAM REPORT: CPT | Mod: ,,, | Performed by: INTERNAL MEDICINE

## 2023-05-02 PROCEDURE — 96361 HYDRATE IV INFUSION ADD-ON: CPT

## 2023-05-02 PROCEDURE — 25000003 PHARM REV CODE 250: Performed by: FAMILY MEDICINE

## 2023-05-02 PROCEDURE — 99285 EMERGENCY DEPT VISIT HI MDM: CPT | Mod: 25

## 2023-05-02 PROCEDURE — 80053 COMPREHEN METABOLIC PANEL: CPT | Performed by: FAMILY MEDICINE

## 2023-05-02 PROCEDURE — 93010 EKG 12-LEAD: ICD-10-PCS | Mod: ,,, | Performed by: INTERNAL MEDICINE

## 2023-05-02 RX ADMIN — SODIUM CHLORIDE 500 ML: 0.9 INJECTION, SOLUTION INTRAVENOUS at 11:05

## 2023-05-03 VITALS
OXYGEN SATURATION: 99 % | RESPIRATION RATE: 18 BRPM | TEMPERATURE: 98 F | BODY MASS INDEX: 22.49 KG/M2 | HEIGHT: 65 IN | WEIGHT: 135 LBS | HEART RATE: 81 BPM | DIASTOLIC BLOOD PRESSURE: 61 MMHG | SYSTOLIC BLOOD PRESSURE: 138 MMHG

## 2023-05-03 LAB
ALBUMIN SERPL BCP-MCNC: 3.6 G/DL (ref 3.5–5.2)
ALP SERPL-CCNC: 90 U/L (ref 55–135)
ALT SERPL W/O P-5'-P-CCNC: 9 U/L (ref 10–44)
ANION GAP SERPL CALC-SCNC: 14 MMOL/L (ref 8–16)
AST SERPL-CCNC: 15 U/L (ref 10–40)
BACTERIA #/AREA URNS HPF: ABNORMAL /HPF
BILIRUB SERPL-MCNC: 0.2 MG/DL (ref 0.1–1)
BILIRUB UR QL STRIP: NEGATIVE
BUN SERPL-MCNC: 14 MG/DL (ref 8–23)
CALCIUM SERPL-MCNC: 9.4 MG/DL (ref 8.7–10.5)
CHLORIDE SERPL-SCNC: 103 MMOL/L (ref 95–110)
CLARITY UR: CLEAR
CO2 SERPL-SCNC: 20 MMOL/L (ref 23–29)
COLOR UR: YELLOW
CREAT SERPL-MCNC: 1.1 MG/DL (ref 0.5–1.4)
EST. GFR  (NO RACE VARIABLE): 49.9 ML/MIN/1.73 M^2
GLUCOSE SERPL-MCNC: 132 MG/DL (ref 70–110)
GLUCOSE UR QL STRIP: NEGATIVE
HGB UR QL STRIP: ABNORMAL
KETONES UR QL STRIP: ABNORMAL
LEUKOCYTE ESTERASE UR QL STRIP: ABNORMAL
LIPASE SERPL-CCNC: 95 U/L (ref 4–60)
MICROSCOPIC COMMENT: ABNORMAL
NITRITE UR QL STRIP: NEGATIVE
PH UR STRIP: 6 [PH] (ref 5–8)
POTASSIUM SERPL-SCNC: 3.8 MMOL/L (ref 3.5–5.1)
PROT SERPL-MCNC: 7.4 G/DL (ref 6–8.4)
PROT UR QL STRIP: NEGATIVE
RBC #/AREA URNS HPF: 6 /HPF (ref 0–4)
SODIUM SERPL-SCNC: 137 MMOL/L (ref 136–145)
SP GR UR STRIP: 1.02 (ref 1–1.03)
SQUAMOUS #/AREA URNS HPF: 3 /HPF
URN SPEC COLLECT METH UR: ABNORMAL
UROBILINOGEN UR STRIP-ACNC: NEGATIVE EU/DL
WBC #/AREA URNS HPF: 15 /HPF (ref 0–5)

## 2023-05-03 PROCEDURE — 96375 TX/PRO/DX INJ NEW DRUG ADDON: CPT

## 2023-05-03 PROCEDURE — 87186 SC STD MICRODIL/AGAR DIL: CPT | Performed by: FAMILY MEDICINE

## 2023-05-03 PROCEDURE — 25000003 PHARM REV CODE 250: Performed by: FAMILY MEDICINE

## 2023-05-03 PROCEDURE — 25500020 PHARM REV CODE 255: Performed by: FAMILY MEDICINE

## 2023-05-03 PROCEDURE — 63600175 PHARM REV CODE 636 W HCPCS: Performed by: FAMILY MEDICINE

## 2023-05-03 PROCEDURE — 87077 CULTURE AEROBIC IDENTIFY: CPT | Performed by: FAMILY MEDICINE

## 2023-05-03 PROCEDURE — 96365 THER/PROPH/DIAG IV INF INIT: CPT

## 2023-05-03 PROCEDURE — 87086 URINE CULTURE/COLONY COUNT: CPT | Performed by: FAMILY MEDICINE

## 2023-05-03 PROCEDURE — 81000 URINALYSIS NONAUTO W/SCOPE: CPT | Performed by: FAMILY MEDICINE

## 2023-05-03 PROCEDURE — 87088 URINE BACTERIA CULTURE: CPT | Performed by: FAMILY MEDICINE

## 2023-05-03 RX ORDER — ACETAMINOPHEN 500 MG
1000 TABLET ORAL
Status: COMPLETED | OUTPATIENT
Start: 2023-05-03 | End: 2023-05-03

## 2023-05-03 RX ORDER — AMOXICILLIN 250 MG
1 CAPSULE ORAL NIGHTLY PRN
Qty: 30 TABLET | Refills: 0 | Status: SHIPPED | OUTPATIENT
Start: 2023-05-03 | End: 2023-06-02

## 2023-05-03 RX ORDER — CEFDINIR 300 MG/1
300 CAPSULE ORAL 2 TIMES DAILY
Qty: 14 CAPSULE | Refills: 0 | Status: SHIPPED | OUTPATIENT
Start: 2023-05-03 | End: 2023-05-10

## 2023-05-03 RX ORDER — MORPHINE SULFATE 2 MG/ML
2 INJECTION, SOLUTION INTRAMUSCULAR; INTRAVENOUS
Status: COMPLETED | OUTPATIENT
Start: 2023-05-03 | End: 2023-05-03

## 2023-05-03 RX ORDER — AMOXICILLIN 250 MG
2 CAPSULE ORAL ONCE
Status: COMPLETED | OUTPATIENT
Start: 2023-05-03 | End: 2023-05-03

## 2023-05-03 RX ORDER — FAMOTIDINE 10 MG/ML
20 INJECTION INTRAVENOUS
Status: COMPLETED | OUTPATIENT
Start: 2023-05-03 | End: 2023-05-03

## 2023-05-03 RX ADMIN — DOCUSATE SODIUM 50MG AND SENNOSIDES 8.6MG 2 TABLET: 8.6; 5 TABLET, FILM COATED ORAL at 02:05

## 2023-05-03 RX ADMIN — CEFTRIAXONE SODIUM 1 G: 1 INJECTION, POWDER, FOR SOLUTION INTRAMUSCULAR; INTRAVENOUS at 03:05

## 2023-05-03 RX ADMIN — MORPHINE SULFATE 2 MG: 2 INJECTION, SOLUTION INTRAMUSCULAR; INTRAVENOUS at 01:05

## 2023-05-03 RX ADMIN — IOHEXOL 75 ML: 350 INJECTION, SOLUTION INTRAVENOUS at 12:05

## 2023-05-03 RX ADMIN — ACETAMINOPHEN 1000 MG: 500 TABLET ORAL at 01:05

## 2023-05-03 RX ADMIN — FAMOTIDINE 20 MG: 10 INJECTION, SOLUTION INTRAVENOUS at 01:05

## 2023-05-03 NOTE — ED NOTES
"Pt here for abdominal pain and constipation.  Pt states she had not had a BM in a week and a half.  States she took a laxative and finally had a BM yesterday described as diarrhea but still feels "blocked".  Pt denying any other abdominal complaints at this time. NAD noted.  Will continue to monitor.   "

## 2023-05-03 NOTE — ED NOTES
Pt resting comfortably in bed at this time.  Warm blanket provided.  Xray at bedside.  Pt voicing no needs at this time.  Pt updated on current plan of care.  Pt agreeable.  NAD noted.  Will continue to monitor.

## 2023-05-03 NOTE — DISCHARGE INSTRUCTIONS
Follow-up in 1 week with General surgery for re-evaluation of your gallstones    Follow-up with her primary care doctor for re-evaluation within 72 hours     Return here for any new or worsening symptoms     Discontinue ciprofloxacin and begin taking cefdinir, this has been sent to her pharmacy

## 2023-05-03 NOTE — ED PROVIDER NOTES
Please note that my documentation in this Electronic Healthcare Record was produced using speech recognition software and therefore may contain errors related to that software.These could include grammar, punctuation and spelling errors or the inclusion/ exclusion of phrases that were not intended. Please contact myself for any clarification, questions or concerns.    HPI: Patient is a 82 y.o. female who presents with the chief complaint of upper abdominal discomfort that she attributes to constipation.  Patient reports that she typically has difficulty with loose stools however for the past 4-5 days she has had intermittent constipation.  She states yesterday she took a stool softener with good results had 3 bowel movements yesterday but states that she had return of epigastric and left upper quadrant abdominal discomfort that she believes is secondary to constipation.  Patient really states she has not been constipated in a very long time this is not normal for her.  She denies any fevers or chills.  States that she has had some symptoms of UTI as well she is on day 4 of ciprofloxacin prescription that she had at home that she did not previously complete.  No fevers or chills there is no nausea vomiting chest pain shortness of breath no focal weakness numbness or tingling.  She is not passing any blood in her stool.  She reports that she feels blocked.        REVIEW OF SYSTEMS - 10 systems were independently reviewed and are otherwise negative with the exception of those items previously documented in the HPI and nursing notes.    Allergy: Pcn [penicillins], Prednisone (bulk), and Sulfa (sulfonamide antibiotics)    Past medical history:   Past Medical History:   Diagnosis Date    Diabetes mellitus     PVC (premature ventricular contraction)        Surgical History:   Past Surgical History:   Procedure Laterality Date    BREAST BIOPSY      HYSTERECTOMY         Social history:   Social History     Socioeconomic  "History    Marital status:    Tobacco Use    Smoking status: Never    Smokeless tobacco: Never   Substance and Sexual Activity    Alcohol use: Yes     Alcohol/week: 2.0 standard drinks     Types: 2 Glasses of wine per week     Comment: occasional     Drug use: Never    Sexual activity: Yes       Family history: non-contributory    EHR: reviewed    Vitals: /61   Pulse 81   Temp 97.9 °F (36.6 °C)   Resp 18   Ht 5' 5" (1.651 m)   Wt 61.2 kg (135 lb)   SpO2 99%   Breastfeeding No   BMI 22.47 kg/m²     PHYSICAL EXAM:    General- awake and alert, oriented, GCS 15, in no acute distress  HEENT- normocephalic, atraumatic, sclera anicteric, moist mucous membranes, PERRL, EOMI  CARDIOVASCULAR- regular rate and rhythm, no murmurs/rubs,/gallops, normal S1-S2  PULMONARY- nonlabored, no respiratory distress, clear to auscultation bilaterally, no wheezes/rhonchi/rales, chest expansion symmetrical  GASTROINTESTINAL- soft, mild epigastric tenderness to palpation, nondistended, no rigidity, rebound, or guarding, no CVA tenderness, Zamora sign negative  NEUROLOGIC- mental status normal, no dysarthria or facial asymmetry, cognition normal sensations equal normal bilateral upper and lower extremities, peripheral pulse 2 +/4, ambulatory with proper gait.  MUSCULOSKELETAL- well-nourished, well-developed  DERMATOLOGIC- warm and dry, no visible rashes  PSYCHIATRIC- normal affect, normal concentration      ED Course as of 05/03/23 0702   Wed May 03, 2023   0016 EKG independently interpreted normal sinus rhythm rate of 76 normal axis, prolonged , right bundle-branch block morphology, overall pattern is similar to previous EKGs 2020, 2019 [JW]   0304 CT Abdomen Pelvis With Contrast  Per Vrad  IMPRESSION:  1. Single calcified gallstone within normal size gallbladder corpus near infundibular region. No biliary  ductal distension. Further clinical correlation is recommended and additional imaging such as  ultrasound may " be considered for further evaluation if clinically indicated.  2. Small, 2.5 cm hiatal hernia.  3. Diverticulosis.  4. Mild colonic fecal retention/constipation.  5. Findings compatible with UTI/pyelonephritis and apparent changes of lobar nephronia upper pole  left kidney. No renal abscess. [JW]      ED Course User Index  [JW] Noah Brand DO       Labs Reviewed   CBC W/ AUTO DIFFERENTIAL - Abnormal; Notable for the following components:       Result Value    Hemoglobin 11.8 (*)     Hematocrit 35.9 (*)     RDW 15.8 (*)     Lymph % 16.6 (*)     All other components within normal limits   COMPREHENSIVE METABOLIC PANEL - Abnormal; Notable for the following components:    CO2 20 (*)     Glucose 132 (*)     ALT 9 (*)     eGFR 49.9 (*)     All other components within normal limits   LIPASE - Abnormal; Notable for the following components:    Lipase 95 (*)     All other components within normal limits   URINALYSIS, REFLEX TO URINE CULTURE - Abnormal; Notable for the following components:    Ketones, UA 1+ (*)     Occult Blood UA 2+ (*)     Leukocytes, UA 3+ (*)     All other components within normal limits    Narrative:     Preferred Collection Type->Urine, Clean Catch  Specimen Source->Urine   URINALYSIS MICROSCOPIC - Abnormal; Notable for the following components:    RBC, UA 6 (*)     WBC, UA 15 (*)     All other components within normal limits    Narrative:     Preferred Collection Type->Urine, Clean Catch  Specimen Source->Urine   CULTURE, URINE       CT Abdomen Pelvis With Contrast    (Results Pending)   X-Ray Chest AP Portable    (Results Pending)       MEDICAL DECISION MAKING: Patient is a 82 y.o. female who presented with chief complaint of constipation. Past medical history was significant for well-controlled diabetes A1c less than 7 per patient's report. Patient's initial vital signs normotensive, heart rate in the normal range. On my initial exam no significant distress. Diagnostic workup included CT  abdomen pelvis which showed a gallstone without any significant gall bladder thickening or surrounding fluid, hiatal hernia, findings with questionable kidney infection, no renal abscess, diverticulosis. ED Course:  Zamora's sign is negative there is no leukocytosis do not believe patient needs emergent right upper quadrant ultrasound I do not believe this would  I think she can have this done as an outpatient.  She could be having some symptoms from her gallstones she could also be having some symptoms from her kidney infection.  I will give her a dose of Rocephin here.  She was given a dose of IV morphine 2 mg for pain with great relief.  Also given a dose of senna/docusate in the ED.  I will write her a prescription for this.  Her CT did show at least a moderate stool burden as well so she is constipated.  I will refer patient to general surgery for her gallstone and questionable symptoms.  Rocephin, discontinue Cipro and begin Omnicef.  Urine culture is pending.  Lipase mildly elevated no radiographic signs of pancreatitis however.  No clinical signs of biliary obstruction, bilirubin alkaline phosphatase AST ALT are not elevated.  Patient's pain is controlled at this time I believe she is stable for outpatient follow-up plan discussed with her and  at bedside, they are in agreement and comfortable going home with outpatient follow-up.  Disposition:  Discharge.    CLINICAL IMPRESSION:  1. Urinary tract infection without hematuria, site unspecified    2. Epigastric discomfort    3. Symptomatic cholelithiasis    4. Constipation, unspecified constipation type         Noah Brand,   05/03/23 0786

## 2023-05-05 LAB — BACTERIA UR CULT: ABNORMAL

## 2023-05-15 ENCOUNTER — HOSPITAL ENCOUNTER (EMERGENCY)
Facility: HOSPITAL | Age: 83
Discharge: HOME | End: 2023-05-15
Attending: EMERGENCY MEDICINE
Payer: MEDICARE

## 2023-05-15 VITALS
RESPIRATION RATE: 18 BRPM | HEART RATE: 94 BPM | OXYGEN SATURATION: 98 % | TEMPERATURE: 98 F | SYSTOLIC BLOOD PRESSURE: 116 MMHG | DIASTOLIC BLOOD PRESSURE: 72 MMHG | BODY MASS INDEX: 21.66 KG/M2 | HEIGHT: 65 IN | WEIGHT: 130 LBS

## 2023-05-15 DIAGNOSIS — R11.2 NAUSEA VOMITING AND DIARRHEA: Primary | ICD-10-CM

## 2023-05-15 DIAGNOSIS — N39.0 URINARY TRACT INFECTION WITHOUT HEMATURIA, SITE UNSPECIFIED: ICD-10-CM

## 2023-05-15 DIAGNOSIS — B37.9 CANDIDA INFECTION: ICD-10-CM

## 2023-05-15 DIAGNOSIS — R19.7 NAUSEA VOMITING AND DIARRHEA: Primary | ICD-10-CM

## 2023-05-15 LAB
ALBUMIN SERPL BCP-MCNC: 3.7 G/DL (ref 3.5–5.2)
ALP SERPL-CCNC: 77 U/L (ref 55–135)
ALT SERPL W/O P-5'-P-CCNC: 11 U/L (ref 10–44)
ANION GAP SERPL CALC-SCNC: 16 MMOL/L (ref 8–16)
AST SERPL-CCNC: 16 U/L (ref 10–40)
BACTERIA #/AREA URNS HPF: ABNORMAL /HPF
BASOPHILS # BLD AUTO: 0.03 K/UL (ref 0–0.2)
BASOPHILS NFR BLD: 0.2 % (ref 0–1.9)
BILIRUB SERPL-MCNC: 0.5 MG/DL (ref 0.1–1)
BILIRUB UR QL STRIP: NEGATIVE
BUN SERPL-MCNC: 18 MG/DL (ref 8–23)
CALCIUM SERPL-MCNC: 9.6 MG/DL (ref 8.7–10.5)
CHLORIDE SERPL-SCNC: 104 MMOL/L (ref 95–110)
CLARITY UR: ABNORMAL
CO2 SERPL-SCNC: 19 MMOL/L (ref 23–29)
COLOR UR: YELLOW
CREAT SERPL-MCNC: 1.1 MG/DL (ref 0.5–1.4)
DIFFERENTIAL METHOD: ABNORMAL
EOSINOPHIL # BLD AUTO: 0 K/UL (ref 0–0.5)
EOSINOPHIL NFR BLD: 0.3 % (ref 0–8)
ERYTHROCYTE [DISTWIDTH] IN BLOOD BY AUTOMATED COUNT: 16.1 % (ref 11.5–14.5)
EST. GFR  (NO RACE VARIABLE): 49.9 ML/MIN/1.73 M^2
GLUCOSE SERPL-MCNC: 173 MG/DL (ref 70–110)
GLUCOSE UR QL STRIP: NEGATIVE
HCT VFR BLD AUTO: 35.9 % (ref 37–48.5)
HGB BLD-MCNC: 11.8 G/DL (ref 12–16)
HGB UR QL STRIP: ABNORMAL
HYALINE CASTS #/AREA URNS LPF: 0 /LPF
IMM GRANULOCYTES # BLD AUTO: 0.05 K/UL (ref 0–0.04)
IMM GRANULOCYTES NFR BLD AUTO: 0.3 % (ref 0–0.5)
KETONES UR QL STRIP: ABNORMAL
LEUKOCYTE ESTERASE UR QL STRIP: ABNORMAL
LIPASE SERPL-CCNC: 50 U/L (ref 4–60)
LYMPHOCYTES # BLD AUTO: 0.5 K/UL (ref 1–4.8)
LYMPHOCYTES NFR BLD: 3.1 % (ref 18–48)
MCH RBC QN AUTO: 27.1 PG (ref 27–31)
MCHC RBC AUTO-ENTMCNC: 32.9 G/DL (ref 32–36)
MCV RBC AUTO: 83 FL (ref 82–98)
MICROSCOPIC COMMENT: ABNORMAL
MONOCYTES # BLD AUTO: 0.5 K/UL (ref 0.3–1)
MONOCYTES NFR BLD: 3.5 % (ref 4–15)
NEUTROPHILS # BLD AUTO: 13.6 K/UL (ref 1.8–7.7)
NEUTROPHILS NFR BLD: 92.6 % (ref 38–73)
NITRITE UR QL STRIP: NEGATIVE
NRBC BLD-RTO: 0 /100 WBC
PH UR STRIP: 6 [PH] (ref 5–8)
PLATELET # BLD AUTO: 243 K/UL (ref 150–450)
PMV BLD AUTO: 10.5 FL (ref 9.2–12.9)
POTASSIUM SERPL-SCNC: 4.9 MMOL/L (ref 3.5–5.1)
PROT SERPL-MCNC: 7.6 G/DL (ref 6–8.4)
PROT UR QL STRIP: ABNORMAL
RBC # BLD AUTO: 4.35 M/UL (ref 4–5.4)
RBC #/AREA URNS HPF: 3 /HPF (ref 0–4)
SODIUM SERPL-SCNC: 139 MMOL/L (ref 136–145)
SP GR UR STRIP: 1.01 (ref 1–1.03)
SQUAMOUS #/AREA URNS HPF: 10 /HPF
URN SPEC COLLECT METH UR: ABNORMAL
UROBILINOGEN UR STRIP-ACNC: NEGATIVE EU/DL
WBC # BLD AUTO: 14.65 K/UL (ref 3.9–12.7)
WBC #/AREA URNS HPF: 35 /HPF (ref 0–5)
YEAST URNS QL MICRO: ABNORMAL

## 2023-05-15 PROCEDURE — 63700000 PHARM REV CODE 250 ALT 637 W/O HCPCS: Performed by: EMERGENCY MEDICINE

## 2023-05-15 PROCEDURE — 96374 THER/PROPH/DIAG INJ IV PUSH: CPT

## 2023-05-15 PROCEDURE — 87186 SC STD MICRODIL/AGAR DIL: CPT | Performed by: EMERGENCY MEDICINE

## 2023-05-15 PROCEDURE — 87088 URINE BACTERIA CULTURE: CPT | Performed by: EMERGENCY MEDICINE

## 2023-05-15 PROCEDURE — 87389 HIV-1 AG W/HIV-1&-2 AB AG IA: CPT | Performed by: EMERGENCY MEDICINE

## 2023-05-15 PROCEDURE — 87077 CULTURE AEROBIC IDENTIFY: CPT | Performed by: EMERGENCY MEDICINE

## 2023-05-15 PROCEDURE — 86803 HEPATITIS C AB TEST: CPT | Performed by: EMERGENCY MEDICINE

## 2023-05-15 PROCEDURE — 96361 HYDRATE IV INFUSION ADD-ON: CPT

## 2023-05-15 PROCEDURE — 80053 COMPREHEN METABOLIC PANEL: CPT | Performed by: EMERGENCY MEDICINE

## 2023-05-15 PROCEDURE — 25000003 PHARM REV CODE 250: Performed by: EMERGENCY MEDICINE

## 2023-05-15 PROCEDURE — 85025 COMPLETE CBC W/AUTO DIFF WBC: CPT | Performed by: EMERGENCY MEDICINE

## 2023-05-15 PROCEDURE — 63600175 PHARM REV CODE 636 W HCPCS: Performed by: EMERGENCY MEDICINE

## 2023-05-15 PROCEDURE — 99284 EMERGENCY DEPT VISIT MOD MDM: CPT | Mod: 25

## 2023-05-15 PROCEDURE — 87086 URINE CULTURE/COLONY COUNT: CPT | Performed by: EMERGENCY MEDICINE

## 2023-05-15 PROCEDURE — 83690 ASSAY OF LIPASE: CPT | Performed by: EMERGENCY MEDICINE

## 2023-05-15 PROCEDURE — 81000 URINALYSIS NONAUTO W/SCOPE: CPT | Performed by: EMERGENCY MEDICINE

## 2023-05-15 RX ORDER — DIPHENOXYLATE HYDROCHLORIDE AND ATROPINE SULFATE 2.5; .025 MG/1; MG/1
2 TABLET ORAL
Status: COMPLETED | OUTPATIENT
Start: 2023-05-15 | End: 2023-05-15

## 2023-05-15 RX ORDER — DIPHENOXYLATE HYDROCHLORIDE AND ATROPINE SULFATE 2.5; .025 MG/1; MG/1
1 TABLET ORAL 4 TIMES DAILY PRN
Qty: 10 TABLET | Refills: 0 | Status: SHIPPED | OUTPATIENT
Start: 2023-05-15 | End: 2023-05-25

## 2023-05-15 RX ORDER — ONDANSETRON 2 MG/ML
4 INJECTION INTRAMUSCULAR; INTRAVENOUS
Status: COMPLETED | OUTPATIENT
Start: 2023-05-15 | End: 2023-05-15

## 2023-05-15 RX ORDER — NITROFURANTOIN 25; 75 MG/1; MG/1
100 CAPSULE ORAL
Status: COMPLETED | OUTPATIENT
Start: 2023-05-15 | End: 2023-05-15

## 2023-05-15 RX ORDER — CIPROFLOXACIN 250 MG/1
500 TABLET, FILM COATED ORAL
Status: DISCONTINUED | OUTPATIENT
Start: 2023-05-15 | End: 2023-05-15

## 2023-05-15 RX ORDER — FLUCONAZOLE 100 MG/1
200 TABLET ORAL
Status: COMPLETED | OUTPATIENT
Start: 2023-05-15 | End: 2023-05-15

## 2023-05-15 RX ORDER — NITROFURANTOIN 25; 75 MG/1; MG/1
100 CAPSULE ORAL 2 TIMES DAILY
Qty: 10 CAPSULE | Refills: 0 | Status: SHIPPED | OUTPATIENT
Start: 2023-05-15 | End: 2023-05-20

## 2023-05-15 RX ORDER — ONDANSETRON 4 MG/1
4 TABLET, ORALLY DISINTEGRATING ORAL EVERY 6 HOURS PRN
Qty: 12 TABLET | Refills: 0 | Status: SHIPPED | OUTPATIENT
Start: 2023-05-15 | End: 2024-03-18

## 2023-05-15 RX ADMIN — NITROFURANTOIN MONOHYDRATE/MACROCRYSTALS 100 MG: 25; 75 CAPSULE ORAL at 10:05

## 2023-05-15 RX ADMIN — SODIUM CHLORIDE 1000 ML: 9 INJECTION, SOLUTION INTRAVENOUS at 07:05

## 2023-05-15 RX ADMIN — FLUCONAZOLE 200 MG: 100 TABLET ORAL at 10:05

## 2023-05-15 RX ADMIN — ONDANSETRON 4 MG: 2 INJECTION INTRAMUSCULAR; INTRAVENOUS at 07:05

## 2023-05-15 RX ADMIN — DIPHENOXYLATE HYDROCHLORIDE AND ATROPINE SULFATE 2 TABLET: 2.5; .025 TABLET ORAL at 07:05

## 2023-05-16 LAB
HCV AB SERPL QL IA: NORMAL
HIV 1+2 AB+HIV1 P24 AG SERPL QL IA: NORMAL

## 2023-05-16 NOTE — ED NOTES
Pt here for N/V/D that started this morning.  Pt reports primarily nausea and diarrhea.  Pt seen here recently for similar complaint.  States she followed up with PCP and started on ear drops and po antibiotics for inflammation of ears.  Pt states she began new medications thursdays.  No other complaints voiced at this time.  NAD noted.  Will continue to monitor.

## 2023-05-16 NOTE — ED NOTES
Pt urine specimen provided not enough per lab.  Pt informed of need for another specimen and provided with water.  NAD noted.  Will continue to monitor.

## 2023-05-16 NOTE — ED PROVIDER NOTES
Encounter Date: 5/15/2023       History     Chief Complaint   Patient presents with    Diarrhea     Nausea, vomiting and diarrhea onset this morning. Imodium AD ineffective.     82-year-old female, here from home via private vehicle for evaluation and treatment of a 1 day history of nausea, vomiting, and diarrhea.  She states she is had at least 50 episodes of loose stools today.  No dark or bloody stool.  No dark or bloody vomitus.  She took over-the-counter Imodium AD without relief of her loose stools.  She took 1 Tums tablet without relief of her nausea.  She is currently taking azithromycin, and using a Ciprodex type here drop for bilateral otalgia.  She is been taking these medications for 3 days.  Denies any history of C diff in the past.  No sick contacts, no suspicious food intake.  Denies ear pain.  Denies hearing loss.  No sore throat or rhinorrhea.  No cough or shortness of breath.  No abdominal pain.  No dysuria or hematuria.  Does state that her urine output seems to be somewhat decreased today.    Review of patient's allergies indicates:   Allergen Reactions    Dexamethasone     Pcn [penicillins]     Prednisone (bulk)     Sulfa (sulfonamide antibiotics)      Past Medical History:   Diagnosis Date    Diabetes mellitus     GERD (gastroesophageal reflux disease)     PVC (premature ventricular contraction)      Past Surgical History:   Procedure Laterality Date    BREAST BIOPSY      HYSTERECTOMY       History reviewed. No pertinent family history.  Social History     Tobacco Use    Smoking status: Never    Smokeless tobacco: Never   Substance Use Topics    Alcohol use: Yes     Alcohol/week: 2.0 standard drinks     Types: 2 Glasses of wine per week     Comment: occasional     Drug use: Never     Review of Systems   Constitutional:  Negative for chills and fever.   HENT:  Negative for congestion, dental problem, ear discharge, hearing loss, rhinorrhea, sinus pressure, sore throat and trouble swallowing.     Eyes: Negative.    Respiratory: Negative.     Cardiovascular: Negative.    Gastrointestinal:  Positive for diarrhea, nausea and vomiting. Negative for abdominal distention, abdominal pain, blood in stool and constipation.   Endocrine: Negative.    Genitourinary: Negative.    Musculoskeletal: Negative.    Skin: Negative.    Allergic/Immunologic: Negative.    Neurological: Negative.    Hematological: Negative.    Psychiatric/Behavioral: Negative.       Physical Exam     Initial Vitals [05/15/23 1906]   BP Pulse Resp Temp SpO2   114/67 102 20 98.2 °F (36.8 °C) 97 %      MAP       --         Physical Exam    Nursing note and vitals reviewed.  Constitutional: She appears well-developed and well-nourished. No distress.   HENT:   Head: Normocephalic and atraumatic.   Right Ear: External ear normal.   Left Ear: External ear normal.   Nose: Nose normal.   Mouth/Throat: Oropharynx is clear and moist. No oropharyngeal exudate.   Eyes: Conjunctivae and EOM are normal. Pupils are equal, round, and reactive to light. No scleral icterus.   Neck: Neck supple. No JVD present.   Normal range of motion.  Cardiovascular:  Normal rate, regular rhythm, normal heart sounds and intact distal pulses.           No murmur heard.  Pulmonary/Chest: Breath sounds normal. No stridor. No respiratory distress. She has no wheezes. She has no rhonchi. She has no rales.   Abdominal: Abdomen is soft. Bowel sounds are normal. She exhibits no distension. There is no abdominal tenderness. There is no rebound and no guarding.   Musculoskeletal:         General: No tenderness or edema. Normal range of motion.      Cervical back: Normal range of motion and neck supple.     Neurological: She is alert and oriented to person, place, and time. She has normal strength. No cranial nerve deficit or sensory deficit. GCS score is 15. GCS eye subscore is 4. GCS verbal subscore is 5. GCS motor subscore is 6.   Skin: Skin is warm and dry. Capillary refill takes less  than 2 seconds. No rash noted. No erythema.   Psychiatric: She has a normal mood and affect. Her behavior is normal.       ED Course   Procedures  Labs Reviewed   CBC W/ AUTO DIFFERENTIAL - Abnormal; Notable for the following components:       Result Value    WBC 14.65 (*)     Hemoglobin 11.8 (*)     Hematocrit 35.9 (*)     RDW 16.1 (*)     Gran # (ANC) 13.6 (*)     Immature Grans (Abs) 0.05 (*)     Lymph # 0.5 (*)     Gran % 92.6 (*)     Lymph % 3.1 (*)     Mono % 3.5 (*)     All other components within normal limits    Narrative:     Release to patient->Immediate   COMPREHENSIVE METABOLIC PANEL - Abnormal; Notable for the following components:    CO2 19 (*)     Glucose 173 (*)     eGFR 49.9 (*)     All other components within normal limits    Narrative:     Release to patient->Immediate   URINALYSIS, REFLEX TO URINE CULTURE - Abnormal; Notable for the following components:    Appearance, UA Cloudy (*)     Protein, UA 1+ (*)     Ketones, UA 1+ (*)     Occult Blood UA 2+ (*)     Leukocytes, UA 3+ (*)     All other components within normal limits    Narrative:     Preferred Collection Type->Urine, Clean Catch  Specimen Source->Urine  Recoll. [id number] by CNW at 05/15/2023 21:04, reason: Insufficient   specimen   URINALYSIS MICROSCOPIC - Abnormal; Notable for the following components:    WBC, UA 35 (*)     Bacteria Moderate (*)     Yeast, UA Rare (*)     All other components within normal limits    Narrative:     Preferred Collection Type->Urine, Clean Catch  Specimen Source->Urine  Recoll. [id number] by CNW at 05/15/2023 21:04, reason: Insufficient   specimen   CLOSTRIDIUM DIFFICILE   CULTURE, STOOL   CULTURE, URINE    Narrative:     Recoll. [id number] by CNW at 05/15/2023 21:04, reason: Insufficient   specimen   LIPASE    Narrative:     Release to patient->Immediate   HIV 1 / 2 ANTIBODY   HEPATITIS C ANTIBODY          Imaging Results    None          Medications   sodium chloride 0.9% bolus 1,000 mL 1,000 mL (0  mLs Intravenous Stopped 5/15/23 2052)   ondansetron injection 4 mg (4 mg Intravenous Given 5/15/23 1950)   diphenoxylate-atropine 2.5-0.025 mg per tablet 2 tablet (2 tablets Oral Given 5/15/23 1952)   fluconazole tablet 200 mg (200 mg Oral Given 5/15/23 2209)   nitrofurantoin (macrocrystal-monohydrate) 100 MG capsule 100 mg (100 mg Oral Given 5/15/23 2208)     Medical Decision Making:   Differential Diagnosis:   Viral illness, gastritis, gastroenteritis, food-borne illness, C diff, acidic infection, antibiotic side effect, etc.  ED Management:  Patient was thoroughly examined, and basic labs are ordered.  No significant findings on physical exam.  Both ears appeared normal.  Abdomen was soft, and nontender.  Normal bowel sounds are present.  Patient's white blood cell count is somewhat elevated, 14,000, but there is no fever.  Leukocytosis likely secondary to her vomiting.  Patient was given Zofran for nausea and Lomotil for loose stools, and while here has had no further episodes of nausea, vomiting, or loose stools.  She was given 1 L normal saline.  Urinalysis does show signs of urinary tract infection, and also yeast infection.  She was given Macrobid and Diflucan here and will continue with the Macrobid at home.  She will follow-up with her primary care provider tomorrow, follow a bland diet, and take frequent sips of clear, sugar free liquids.  Return here for any worsening signs or symptoms.                        Clinical Impression:   Final diagnoses:  [R11.2, R19.7] Nausea vomiting and diarrhea (Primary)  [N39.0] Urinary tract infection without hematuria, site unspecified  [B37.9] Candida infection        ED Disposition Condition    Discharge Stable          ED Prescriptions       Medication Sig Dispense Start Date End Date Auth. Provider    ondansetron (ZOFRAN-ODT) 4 MG TbDL Take 1 tablet (4 mg total) by mouth every 6 (six) hours as needed (nausea). 12 tablet 5/15/2023 -- Last Morrison MD     diphenoxylate-atropine 2.5-0.025 mg (LOMOTIL) 2.5-0.025 mg per tablet Take 1 tablet by mouth 4 (four) times daily as needed for Diarrhea. 10 tablet 5/15/2023 5/25/2023 Last Morrison MD    nitrofurantoin, macrocrystal-monohydrate, (MACROBID) 100 MG capsule Take 1 capsule (100 mg total) by mouth 2 (two) times daily. for 5 days 10 capsule 5/15/2023 5/20/2023 Last Morrison MD          Follow-up Information       Follow up With Specialties Details Why Contact Info    Your primary care doctor  Call in 1 day      [facility] Emergency Dept Emergency Medicine  As needed, If symptoms worsen [address] [address] [phone]             Last Morrison MD  05/15/23 2202       Last Morrison MD  05/15/23 5979

## 2023-05-16 NOTE — DISCHARGE INSTRUCTIONS
Take Zofran for nausea and vomiting, take Lomotil for loose stools, and take Macrobid for urinary tract infection.  Follow-up with your doctor tomorrow.  Return here for any worsening signs or symptoms.  Be sure to follow a very bland diet, and take frequent sips of clear, sugar free liquids.

## 2023-05-18 LAB — BACTERIA UR CULT: ABNORMAL

## 2023-06-07 ENCOUNTER — TELEPHONE (OUTPATIENT)
Dept: SURGERY | Facility: CLINIC | Age: 83
End: 2023-06-07
Payer: MEDICARE

## 2023-06-07 NOTE — TELEPHONE ENCOUNTER
Attempted to contact Stacie Hudson to discuss  scheduling a consult with Dr. Barry Frederick to discuss Cholelithiasis symptoms .    Left voice mail to return our call at [phone] on [phone] (home).    Adam Pino MA

## 2023-07-03 ENCOUNTER — HOSPITAL ENCOUNTER (EMERGENCY)
Facility: HOSPITAL | Age: 83
Discharge: HOME | End: 2023-07-04
Attending: STUDENT IN AN ORGANIZED HEALTH CARE EDUCATION/TRAINING PROGRAM
Payer: MEDICARE

## 2023-07-03 DIAGNOSIS — R10.11 RIGHT UPPER QUADRANT ABDOMINAL PAIN: Primary | ICD-10-CM

## 2023-07-03 DIAGNOSIS — N30.01 ACUTE CYSTITIS WITH HEMATURIA: ICD-10-CM

## 2023-07-03 LAB
ALBUMIN SERPL BCP-MCNC: 3.1 G/DL (ref 3.5–5.2)
ALP SERPL-CCNC: 74 U/L (ref 55–135)
ALT SERPL W/O P-5'-P-CCNC: 6 U/L (ref 10–44)
ANION GAP SERPL CALC-SCNC: 13 MMOL/L (ref 8–16)
AST SERPL-CCNC: 10 U/L (ref 10–40)
BACTERIA #/AREA URNS HPF: ABNORMAL /HPF
BASOPHILS # BLD AUTO: 0.02 K/UL (ref 0–0.2)
BASOPHILS NFR BLD: 0.2 % (ref 0–1.9)
BILIRUB SERPL-MCNC: 0.4 MG/DL (ref 0.1–1)
BILIRUB UR QL STRIP: NEGATIVE
BUN SERPL-MCNC: 15 MG/DL (ref 8–23)
CALCIUM SERPL-MCNC: 9 MG/DL (ref 8.7–10.5)
CHLORIDE SERPL-SCNC: 104 MMOL/L (ref 95–110)
CLARITY UR: CLEAR
CO2 SERPL-SCNC: 19 MMOL/L (ref 23–29)
COLOR UR: YELLOW
CREAT SERPL-MCNC: 0.9 MG/DL (ref 0.5–1.4)
DIFFERENTIAL METHOD: ABNORMAL
EOSINOPHIL # BLD AUTO: 0.1 K/UL (ref 0–0.5)
EOSINOPHIL NFR BLD: 0.9 % (ref 0–8)
ERYTHROCYTE [DISTWIDTH] IN BLOOD BY AUTOMATED COUNT: 16 % (ref 11.5–14.5)
EST. GFR  (NO RACE VARIABLE): >60 ML/MIN/1.73 M^2
GLUCOSE SERPL-MCNC: 191 MG/DL (ref 70–110)
GLUCOSE UR QL STRIP: NEGATIVE
HCT VFR BLD AUTO: 31.8 % (ref 37–48.5)
HGB BLD-MCNC: 10.5 G/DL (ref 12–16)
HGB UR QL STRIP: NEGATIVE
IMM GRANULOCYTES # BLD AUTO: 0.03 K/UL (ref 0–0.04)
IMM GRANULOCYTES NFR BLD AUTO: 0.3 % (ref 0–0.5)
KETONES UR QL STRIP: NEGATIVE
LACTATE SERPL-SCNC: 2 MMOL/L (ref 0.5–2.2)
LEUKOCYTE ESTERASE UR QL STRIP: ABNORMAL
LIPASE SERPL-CCNC: 55 U/L (ref 4–60)
LYMPHOCYTES # BLD AUTO: 0.6 K/UL (ref 1–4.8)
LYMPHOCYTES NFR BLD: 4.8 % (ref 18–48)
MCH RBC QN AUTO: 27.3 PG (ref 27–31)
MCHC RBC AUTO-ENTMCNC: 33 G/DL (ref 32–36)
MCV RBC AUTO: 83 FL (ref 82–98)
MICROSCOPIC COMMENT: ABNORMAL
MONOCYTES # BLD AUTO: 0.2 K/UL (ref 0.3–1)
MONOCYTES NFR BLD: 1.4 % (ref 4–15)
NEUTROPHILS # BLD AUTO: 10.9 K/UL (ref 1.8–7.7)
NEUTROPHILS NFR BLD: 92.4 % (ref 38–73)
NITRITE UR QL STRIP: NEGATIVE
NRBC BLD-RTO: 0 /100 WBC
PH UR STRIP: 7 [PH] (ref 5–8)
PLATELET # BLD AUTO: 193 K/UL (ref 150–450)
PMV BLD AUTO: 10.2 FL (ref 9.2–12.9)
POTASSIUM SERPL-SCNC: 3.9 MMOL/L (ref 3.5–5.1)
PROT SERPL-MCNC: 6.5 G/DL (ref 6–8.4)
PROT UR QL STRIP: NEGATIVE
RBC # BLD AUTO: 3.84 M/UL (ref 4–5.4)
RBC #/AREA URNS HPF: 4 /HPF (ref 0–4)
SODIUM SERPL-SCNC: 136 MMOL/L (ref 136–145)
SP GR UR STRIP: 1.01 (ref 1–1.03)
SQUAMOUS #/AREA URNS HPF: 2 /HPF
URN SPEC COLLECT METH UR: ABNORMAL
UROBILINOGEN UR STRIP-ACNC: NEGATIVE EU/DL
WBC # BLD AUTO: 11.82 K/UL (ref 3.9–12.7)
WBC #/AREA URNS HPF: 10 /HPF (ref 0–5)

## 2023-07-03 PROCEDURE — 96365 THER/PROPH/DIAG IV INF INIT: CPT

## 2023-07-03 PROCEDURE — 85025 COMPLETE CBC W/AUTO DIFF WBC: CPT | Performed by: STUDENT IN AN ORGANIZED HEALTH CARE EDUCATION/TRAINING PROGRAM

## 2023-07-03 PROCEDURE — 83690 ASSAY OF LIPASE: CPT | Performed by: STUDENT IN AN ORGANIZED HEALTH CARE EDUCATION/TRAINING PROGRAM

## 2023-07-03 PROCEDURE — 99284 EMERGENCY DEPT VISIT MOD MDM: CPT | Mod: 25

## 2023-07-03 PROCEDURE — 25000003 PHARM REV CODE 250: Performed by: STUDENT IN AN ORGANIZED HEALTH CARE EDUCATION/TRAINING PROGRAM

## 2023-07-03 PROCEDURE — 81000 URINALYSIS NONAUTO W/SCOPE: CPT | Performed by: STUDENT IN AN ORGANIZED HEALTH CARE EDUCATION/TRAINING PROGRAM

## 2023-07-03 PROCEDURE — 83605 ASSAY OF LACTIC ACID: CPT | Performed by: STUDENT IN AN ORGANIZED HEALTH CARE EDUCATION/TRAINING PROGRAM

## 2023-07-03 PROCEDURE — 63600175 PHARM REV CODE 636 W HCPCS: Performed by: STUDENT IN AN ORGANIZED HEALTH CARE EDUCATION/TRAINING PROGRAM

## 2023-07-03 PROCEDURE — 87040 BLOOD CULTURE FOR BACTERIA: CPT | Mod: 59 | Performed by: STUDENT IN AN ORGANIZED HEALTH CARE EDUCATION/TRAINING PROGRAM

## 2023-07-03 PROCEDURE — 96361 HYDRATE IV INFUSION ADD-ON: CPT

## 2023-07-03 PROCEDURE — 80053 COMPREHEN METABOLIC PANEL: CPT | Performed by: STUDENT IN AN ORGANIZED HEALTH CARE EDUCATION/TRAINING PROGRAM

## 2023-07-03 RX ADMIN — CEFTRIAXONE SODIUM 1 G: 1 INJECTION, POWDER, FOR SOLUTION INTRAMUSCULAR; INTRAVENOUS at 11:07

## 2023-07-03 RX ADMIN — SODIUM CHLORIDE 1000 ML: 9 INJECTION, SOLUTION INTRAVENOUS at 10:07

## 2023-07-04 VITALS
SYSTOLIC BLOOD PRESSURE: 120 MMHG | HEIGHT: 66 IN | RESPIRATION RATE: 17 BRPM | DIASTOLIC BLOOD PRESSURE: 59 MMHG | OXYGEN SATURATION: 97 % | WEIGHT: 127 LBS | HEART RATE: 92 BPM | TEMPERATURE: 100 F | BODY MASS INDEX: 20.41 KG/M2

## 2023-07-04 PROCEDURE — 25000003 PHARM REV CODE 250: Performed by: STUDENT IN AN ORGANIZED HEALTH CARE EDUCATION/TRAINING PROGRAM

## 2023-07-04 RX ORDER — ACETAMINOPHEN 325 MG/1
650 TABLET ORAL
Status: COMPLETED | OUTPATIENT
Start: 2023-07-04 | End: 2023-07-04

## 2023-07-04 RX ORDER — CEPHALEXIN 500 MG/1
500 CAPSULE ORAL 4 TIMES DAILY
Qty: 20 CAPSULE | Refills: 0 | Status: SHIPPED | OUTPATIENT
Start: 2023-07-04 | End: 2023-07-11

## 2023-07-04 RX ADMIN — ACETAMINOPHEN 650 MG: 325 TABLET ORAL at 12:07

## 2023-07-04 NOTE — ED PROVIDER NOTES
Encounter Date: 7/3/2023       History     Chief Complaint   Patient presents with    Abdominal Pain     Abdominal pain starting this afternoon, fever of 101.2 approximately 1 hour ago, patient states she took 2 extra strength advil at that time.       HPI    Ms. Russo is a 82-year-old female with past medical history of type 2 diabetes, GERD, cholelithiasis, diverticulosis, hiatal hernia, recurrent UTIs presenting with 1 day of generalized headache, right upper quadrant abdominal pain and fever to 101.2 that resolved with 2 doses of Advil just prior to arrival.  She denies any nausea vomiting, decreased p.o. intake dysuria, does have a little bit of right-sided flank pain, otherwise no chest pain.  Review of patient's allergies indicates:   Allergen Reactions    Dexamethasone Other (See Comments)     headache    Pcn [penicillins] Itching    Prednisone (bulk) Other (See Comments)     headache    Sulfa (sulfonamide antibiotics)      Past Medical History:   Diagnosis Date    Diabetes mellitus     GERD (gastroesophageal reflux disease)     PVC (premature ventricular contraction)      Past Surgical History:   Procedure Laterality Date    BREAST BIOPSY      HYSTERECTOMY       No family history on file.  Social History     Tobacco Use    Smoking status: Never    Smokeless tobacco: Never   Substance Use Topics    Alcohol use: Yes     Alcohol/week: 2.0 standard drinks     Types: 2 Glasses of wine per week     Comment: occasional     Drug use: Never     Review of Systems   Constitutional:  Positive for fever. Negative for activity change and appetite change.   Cardiovascular:  Negative for chest pain.   Gastrointestinal:  Positive for abdominal pain. Negative for nausea and vomiting.   Genitourinary:  Negative for dysuria.     Physical Exam     Initial Vitals [07/03/23 2156]   BP Pulse Resp Temp SpO2   135/63 105 19 99.6 °F (37.6 °C) 97 %      MAP       --         Physical Exam    Nursing note and vitals  reviewed.  Constitutional: She appears well-developed and well-nourished.   HENT:   Head: Normocephalic and atraumatic.   Eyes: EOM are normal. Pupils are equal, round, and reactive to light.   Neck:   Normal range of motion.  Cardiovascular:  Normal heart sounds and intact distal pulses.           Pulmonary/Chest: Breath sounds normal. No respiratory distress.   Abdominal: Abdomen is soft. Bowel sounds are normal. She exhibits no distension and no mass. There is abdominal tenderness (Mild right upper quadrant, negative Zamora's).   Mild right CVA tenderness. There is no rebound and no guarding.   Musculoskeletal:         General: No tenderness. Normal range of motion.      Cervical back: Normal range of motion.     Neurological: She is alert and oriented to person, place, and time.   Skin: Skin is warm and dry.   Psychiatric: She has a normal mood and affect. Thought content normal.       ED Course   Procedures  Labs Reviewed   CBC W/ AUTO DIFFERENTIAL - Abnormal; Notable for the following components:       Result Value    RBC 3.84 (*)     Hemoglobin 10.5 (*)     Hematocrit 31.8 (*)     RDW 16.0 (*)     Gran # (ANC) 10.9 (*)     Lymph # 0.6 (*)     Mono # 0.2 (*)     Gran % 92.4 (*)     Lymph % 4.8 (*)     Mono % 1.4 (*)     All other components within normal limits   COMPREHENSIVE METABOLIC PANEL - Abnormal; Notable for the following components:    CO2 19 (*)     Glucose 191 (*)     Albumin 3.1 (*)     ALT 6 (*)     All other components within normal limits   URINALYSIS, REFLEX TO URINE CULTURE - Abnormal; Notable for the following components:    Leukocytes, UA 1+ (*)     All other components within normal limits    Narrative:     Preferred Collection Type->Urine, Clean Catch  Specimen Source->Urine   URINALYSIS MICROSCOPIC - Abnormal; Notable for the following components:    WBC, UA 10 (*)     Bacteria Moderate (*)     All other components within normal limits    Narrative:     Preferred Collection Type->Urine,  Clean Catch  Specimen Source->Urine   CULTURE, BLOOD   CULTURE, BLOOD   LIPASE   LACTIC ACID, PLASMA          Imaging Results    None          Medications   cefTRIAXone (ROCEPHIN) 1 g in dextrose 5 % in water (D5W) 5 % 100 mL IVPB (MB+) (1 g Intravenous New Bag 7/3/23 0465)   acetaminophen tablet 650 mg (has no administration in time range)   sodium chloride 0.9% bolus 1,000 mL 1,000 mL (1,000 mLs Intravenous New Bag 7/3/23 2240)     Medical Decision Making:   History:   Old Medical Records: I decided to obtain old medical records.  Old Records Summarized: records from clinic visits, records from previous admission(s) and other records.  Clinical Tests:   Lab Tests: Ordered and Reviewed  Radiological Study: Ordered and Reviewed  Medical Tests: Ordered and Reviewed       MDM    Assessment:  82-year-old female with past medical history of type 2 diabetes, GERD, cholelithiasis, diverticulosis, hiatal hernia, recurrent UTIs presenting with 1 day of generalized headache, right upper quadrant abdominal pain and fever to 101.2 that resolved with 2 doses of Advil just prior to arrival.  She is mildly tender in the right upper quadrant with negative Zamora sign.  She is afebrile, mildly tachycardic but vitals otherwise stable.  Ddx:  Is most concerning for UTI, also considered kidney stone, biliary pathology, pancreatitis, lower suspicion for obstruction but considered.  Workup:  Basic labs, lactate, lipase, blood cultures, urinalysis.  IV fluid bolus and Zofran for symptoms.  Will Ativan imaging as indicated workup.  Dispo:   Pending work-up and and re-evaluation.  Anticipate possible admission.      Julianne Brown MD  10:58 PM 07/04/2023               ED Course as of 07/04/23 0011   Mon Jul 03, 2023 2358 Lactate, Joshua: 2.0 [CH]   2358 Leukocytes, UA(!): 1+ [CH]   2358 WBC, UA(!): 10 [CH]   2358 Bacteria, UA(!): Moderate [CH]   2358 Lipase: 55 [CH]   2358 Creatinine: 0.9 [CH]   Tue Jul 04, 2023   0007 On reassessment,  feeling better but feels like her temperature is coming back.  Repeat temperature check was 100.  Workup reviewed, appears to have urinary tract infection, CBC otherwise notable for white blood cell count 61023, she has a normocytic anemia around her baseline, electrolytes and liver enzymes within limits.  Lactate 2.0.  1 g of Rocephin ordered for suspected UTI.  She is willing to go on a course of oral antibiotics, I think this is a reasonable plan.  ED return precautions given if symptoms worsen or do not improve after 24 hours of antibiotics. [CH]      ED Course User Index  [CH] Julianne Brown MD                 Clinical Impression:   Final diagnoses:  [R10.11] Right upper quadrant abdominal pain (Primary)  [N30.01] Acute cystitis with hematuria        ED Disposition Condition    Discharge Stable          ED Prescriptions       Medication Sig Dispense Start Date End Date Auth. Provider    cephALEXin (KEFLEX) 500 MG capsule Take 1 capsule (500 mg total) by mouth 4 (four) times daily. for 7 days 20 capsule 7/4/2023 7/11/2023 Julianne Brown MD          Follow-up Information       Follow up With Specialties Details Why Contact Info    [facility] Medicine Family Medicine Schedule an appointment as soon as possible for a visit in 2 days for follow up of this ED visit, To establish care with a Primary Care Provider, Return to ED if symptoms worsen, If you can not keep food down [address], [address]  [facility] [address] [phone]             Julianne Brown MD  07/04/23 0011     (0) No drift

## 2023-07-09 LAB
BACTERIA BLD CULT: NORMAL
BACTERIA BLD CULT: NORMAL

## 2023-10-23 ENCOUNTER — OFFICE VISIT (OUTPATIENT)
Dept: URGENT CARE | Facility: CLINIC | Age: 83
End: 2023-10-23
Payer: MEDICARE

## 2023-10-23 VITALS
BODY MASS INDEX: 20.57 KG/M2 | WEIGHT: 128 LBS | DIASTOLIC BLOOD PRESSURE: 72 MMHG | RESPIRATION RATE: 18 BRPM | OXYGEN SATURATION: 97 % | TEMPERATURE: 99 F | HEIGHT: 66 IN | HEART RATE: 82 BPM | SYSTOLIC BLOOD PRESSURE: 138 MMHG

## 2023-10-23 DIAGNOSIS — J02.9 SORE THROAT: ICD-10-CM

## 2023-10-23 DIAGNOSIS — J02.9 PHARYNGITIS, UNSPECIFIED ETIOLOGY: ICD-10-CM

## 2023-10-23 DIAGNOSIS — R35.0 URINARY FREQUENCY: Primary | ICD-10-CM

## 2023-10-23 LAB
BILIRUB UR QL STRIP: NEGATIVE
CTP QC/QA: YES
GLUCOSE UR QL STRIP: NEGATIVE
KETONES UR QL STRIP: POSITIVE
LEUKOCYTE ESTERASE UR QL STRIP: NEGATIVE
MOLECULAR STREP A: NEGATIVE
PH, POC UA: 5.5
POC BLOOD, URINE: NEGATIVE
POC MOLECULAR INFLUENZA A AGN: NEGATIVE
POC MOLECULAR INFLUENZA B AGN: NEGATIVE
POC NITRATES, URINE: NEGATIVE
PROT UR QL STRIP: NEGATIVE
SARS-COV-2 AG RESP QL IA.RAPID: NEGATIVE
SP GR UR STRIP: 1.02 (ref 1–1.03)
UROBILINOGEN UR STRIP-ACNC: NORMAL (ref 0.1–1.1)

## 2023-10-23 PROCEDURE — 81003 URINALYSIS AUTO W/O SCOPE: CPT | Mod: QW,S$GLB,, | Performed by: NURSE PRACTITIONER

## 2023-10-23 PROCEDURE — 87651 STREP A DNA AMP PROBE: CPT | Mod: QW,,, | Performed by: NURSE PRACTITIONER

## 2023-10-23 PROCEDURE — 99213 OFFICE O/P EST LOW 20 MIN: CPT | Mod: S$GLB,,, | Performed by: NURSE PRACTITIONER

## 2023-10-23 PROCEDURE — 87502 POCT INFLUENZA A/B MOLECULAR: ICD-10-PCS | Mod: QW,,, | Performed by: NURSE PRACTITIONER

## 2023-10-23 PROCEDURE — 87651 POCT STREP A MOLECULAR: ICD-10-PCS | Mod: QW,,, | Performed by: NURSE PRACTITIONER

## 2023-10-23 PROCEDURE — 87502 INFLUENZA DNA AMP PROBE: CPT | Mod: QW,,, | Performed by: NURSE PRACTITIONER

## 2023-10-23 PROCEDURE — 99213 PR OFFICE/OUTPT VISIT, EST, LEVL III, 20-29 MIN: ICD-10-PCS | Mod: S$GLB,,, | Performed by: NURSE PRACTITIONER

## 2023-10-23 PROCEDURE — 81003 POCT URINALYSIS, DIPSTICK, AUTOMATED, W/O SCOPE: ICD-10-PCS | Mod: QW,S$GLB,, | Performed by: NURSE PRACTITIONER

## 2023-10-23 PROCEDURE — 87811 SARS CORONAVIRUS 2 ANTIGEN POCT, MANUAL READ: ICD-10-PCS | Mod: QW,S$GLB,, | Performed by: NURSE PRACTITIONER

## 2023-10-23 PROCEDURE — 87811 SARS-COV-2 COVID19 W/OPTIC: CPT | Mod: QW,S$GLB,, | Performed by: NURSE PRACTITIONER

## 2023-10-23 RX ORDER — AZITHROMYCIN 250 MG/1
TABLET, FILM COATED ORAL
Qty: 6 TABLET | Refills: 0 | Status: SHIPPED | OUTPATIENT
Start: 2023-10-23 | End: 2024-03-18

## 2023-10-23 NOTE — PROGRESS NOTES
"Subjective:       Patient ID: Stacie Hudson is a 83 y.o. female.    Vitals:  height is 5' 6" (1.676 m) and weight is 58.1 kg (128 lb). Her oral temperature is 98.9 °F (37.2 °C). Her blood pressure is 138/72 and her pulse is 82. Her respiration is 18 and oxygen saturation is 97%.     Chief Complaint: Sore Throat (Sore throat x 1 week.  Fever for a week.  Fever was 99)    This is a 83 y.o. female who presents today with a chief complaint of  Sore Throat: Sore throat x 1 week.  Fever for a week. Patient states "It might be a UTI."    Sore Throat   This is a recurrent problem. The current episode started 1 to 4 weeks ago. The problem has been waxing and waning. The pain is worse on the left side. There has been no fever. Associated symptoms include trouble swallowing. She has tried acetaminophen for the symptoms.       HENT:  Positive for sore throat and trouble swallowing.            Objective:      Physical Exam   Constitutional: She is oriented to person, place, and time. normal  HENT:   Head: Normocephalic and atraumatic.   Ears:   Right Ear: Tympanic membrane, external ear and ear canal normal.   Left Ear: Tympanic membrane, external ear and ear canal normal.   Nose: Nose normal.   Mouth/Throat: Mucous membranes are moist. Posterior oropharyngeal erythema present. Oropharynx is clear.   Eyes: Conjunctivae are normal. Extraocular movement intact   Neck: Neck supple.   Cardiovascular: Normal rate, regular rhythm, normal heart sounds and normal pulses.   Pulmonary/Chest: Effort normal and breath sounds normal.   Abdominal: Normal appearance.   Musculoskeletal: Normal range of motion.         General: Normal range of motion.   Neurological: She is alert and oriented to person, place, and time.   Skin: Skin is warm and dry.   Psychiatric: Her behavior is normal.   Vitals reviewed.        Past medical history and current medications reviewed.       Assessment:           1. Pharyngitis, unspecified etiology    2. Sore " throat    3. Urinary frequency              Plan:     Medication as prescribed. Follow up as advised.    Pharyngitis, unspecified etiology  -     azithromycin (Z-LAURENT) 250 MG tablet; Take 2 tablets by mouth on day 1; Take 1 tablet by mouth on days 2-5  Dispense: 6 tablet; Refill: 0    Sore throat  -     SARS Coronavirus 2 Antigen, POCT Manual Read  -     POCT Influenza A/B MOLECULAR  -     POCT Strep A, Molecular  -     XR CHEST PA AND LATERAL; Future; Expected date: 10/23/2023    Urinary frequency  -     POCT URINALYSIS             There are no Patient Instructions on file for this visit.

## 2024-03-18 ENCOUNTER — OFFICE VISIT (OUTPATIENT)
Dept: FAMILY MEDICINE | Facility: CLINIC | Age: 84
End: 2024-03-18
Payer: MEDICARE

## 2024-03-18 VITALS
OXYGEN SATURATION: 99 % | SYSTOLIC BLOOD PRESSURE: 124 MMHG | HEART RATE: 84 BPM | WEIGHT: 113.13 LBS | BODY MASS INDEX: 18.18 KG/M2 | DIASTOLIC BLOOD PRESSURE: 79 MMHG | RESPIRATION RATE: 16 BRPM | HEIGHT: 66 IN

## 2024-03-18 DIAGNOSIS — N39.0 RECURRENT UTI: ICD-10-CM

## 2024-03-18 DIAGNOSIS — Z13.220 ENCOUNTER FOR LIPID SCREENING FOR CARDIOVASCULAR DISEASE: ICD-10-CM

## 2024-03-18 DIAGNOSIS — R79.89 LOW SERUM VITAMIN D: ICD-10-CM

## 2024-03-18 DIAGNOSIS — R13.10 DYSPHAGIA, UNSPECIFIED TYPE: Primary | ICD-10-CM

## 2024-03-18 DIAGNOSIS — Z13.6 ENCOUNTER FOR LIPID SCREENING FOR CARDIOVASCULAR DISEASE: ICD-10-CM

## 2024-03-18 DIAGNOSIS — E55.9 VITAMIN D DEFICIENCY: ICD-10-CM

## 2024-03-18 DIAGNOSIS — H35.30 MACULAR DEGENERATION, UNSPECIFIED LATERALITY, UNSPECIFIED TYPE: ICD-10-CM

## 2024-03-18 DIAGNOSIS — I49.3 FREQUENT PVCS: ICD-10-CM

## 2024-03-18 DIAGNOSIS — E53.8 B12 DEFICIENCY: ICD-10-CM

## 2024-03-18 DIAGNOSIS — E11.9 TYPE 2 DIABETES MELLITUS WITHOUT COMPLICATION, WITHOUT LONG-TERM CURRENT USE OF INSULIN: ICD-10-CM

## 2024-03-18 DIAGNOSIS — K21.9 GASTROESOPHAGEAL REFLUX DISEASE WITHOUT ESOPHAGITIS: ICD-10-CM

## 2024-03-18 PROCEDURE — 99999 PR PBB SHADOW E&M-EST. PATIENT-LVL III: CPT | Mod: PBBFAC,,, | Performed by: STUDENT IN AN ORGANIZED HEALTH CARE EDUCATION/TRAINING PROGRAM

## 2024-03-18 PROCEDURE — 99213 OFFICE O/P EST LOW 20 MIN: CPT | Mod: PBBFAC,PN | Performed by: STUDENT IN AN ORGANIZED HEALTH CARE EDUCATION/TRAINING PROGRAM

## 2024-03-18 PROCEDURE — 99204 OFFICE O/P NEW MOD 45 MIN: CPT | Mod: S$PBB,,, | Performed by: STUDENT IN AN ORGANIZED HEALTH CARE EDUCATION/TRAINING PROGRAM

## 2024-03-18 RX ORDER — NITROFURANTOIN MACROCRYSTALS 50 MG/1
50 CAPSULE ORAL EVERY OTHER DAY
COMMUNITY

## 2024-03-18 NOTE — ASSESSMENT & PLAN NOTE
Has been on metformin but caused diarrhea  Tried Januvia without improvement  Been on alogliptin with great results.  Last A1C is consistently less than 7. Last 5.8%  Taking statin therapy

## 2024-03-18 NOTE — ASSESSMENT & PLAN NOTE
None recently and overall doing well.  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   Taking daily macrobid for prevention

## 2024-03-18 NOTE — PROGRESS NOTES
Subjective:       Patient ID: Stacie Hudson is a 83 y.o. female.    Chief Complaint: Establish Care    Patient Active Problem List:     Recurrent UTI     Dysphagia     Type 2 diabetes mellitus without complication, without long-term current use of insulin     Low serum vitamin D     B12 deficiency     Macular degeneration     Frequent PVCs     Gastroesophageal reflux disease without esophagitis    Current Outpatient Medications:  alogliptin benzoate (NESINA ORAL), Take 10 mg by mouth once daily.   famotidine (PEPCID) 20 MG tablet, Take 20 mg by mouth 2 (two) times daily.  metoprolol succinate (TOPROL-XL) 50 MG 24 hr tablet, Take 50 mg by mouth once daily.   nitrofurantoin (MACRODANTIN) 50 MG capsule, Take 50 mg by mouth every other day.  rosuvastatin (CRESTOR) 5 MG tablet, Take 5 mg by mouth every other day.   vit C/E/Zn/coppr/lutein/zeaxan (PRESERVISION AREDS-2 ORAL), Take 1 tablet by mouth 2 (two) times a day.  vitamin D (VITAMIN D3) 1000 units Tab, Take 2,000 Units by mouth once daily.     No current facility-administered medications for this visit.      Wt Readings from Last 10 Encounters:  03/18/24 : 51.3 kg (113 lb 1.6 oz)  10/23/23 : 58.1 kg (128 lb)  07/03/23 : 57.6 kg (127 lb)  05/15/23 : 59 kg (130 lb)  05/02/23 : 61.2 kg (135 lb)  12/23/22 : 63.5 kg (140 lb)  10/07/20 : 63 kg (139 lb)  05/20/19 : 67.1 kg (148 lb)    She lost a significant amount of weight without trying due to esophagus issues  And wasn't eating much.  This is better and weight has stabilized.     Not interested in bone density or meds because she cannot swallow the meds and has been told that she cannot tolerate it.       Review of Systems   Constitutional:  Negative for activity change and appetite change.   Respiratory:  Negative for shortness of breath.    Cardiovascular:  Negative for chest pain.   Gastrointestinal:  Negative for abdominal pain.   Genitourinary:  Negative for dysuria.   Integumentary:  Negative for rash.    Psychiatric/Behavioral:  Negative for dysphoric mood and sleep disturbance. The patient is not nervous/anxious.          Objective:      Physical Exam  Constitutional:       General: She is not in acute distress.     Appearance: Normal appearance. She is not ill-appearing.   Eyes:      Conjunctiva/sclera: Conjunctivae normal.   Cardiovascular:      Rate and Rhythm: Normal rate and regular rhythm.      Heart sounds: Normal heart sounds. No murmur heard.  Pulmonary:      Effort: Pulmonary effort is normal. No respiratory distress.      Breath sounds: Normal breath sounds.   Musculoskeletal:      Right lower leg: No edema.      Left lower leg: No edema.   Skin:     General: Skin is warm and dry.   Neurological:      Mental Status: She is alert. Mental status is at baseline.      Gait: Gait normal.   Psychiatric:         Mood and Affect: Mood normal.         Behavior: Behavior normal.         Thought Content: Thought content normal.         Judgment: Judgment normal.         Assessment:       1. Dysphagia, unspecified type    2. Recurrent UTI    3. Type 2 diabetes mellitus without complication, without long-term current use of insulin    4. Low serum vitamin D    5. B12 deficiency    6. Macular degeneration, unspecified laterality, unspecified type    7. Frequent PVCs    8. Gastroesophageal reflux disease without esophagitis    9. Encounter for lipid screening for cardiovascular disease    10. Vitamin D deficiency        Plan:       Problem List Items Addressed This Visit          Ophtho    Macular degeneration     Seeing Dr. Torres and getting injections in her eyes            Cardiac/Vascular    Frequent PVCs     Has not seen cardiology recently and is stable on metoprolol  Improved and doing well.             Renal/    Recurrent UTI     None recently and overall doing well.  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   Taking daily macrobid for prevention            Endocrine    Type 2  diabetes mellitus without complication, without long-term current use of insulin     Has been on metformin but caused diarrhea  Tried Januvia without improvement  Been on alogliptin with great results.  Last A1C is consistently less than 7. Last 5.8%  Taking statin therapy         Relevant Orders    CBC Auto Differential    Comprehensive Metabolic Panel    Microalbumin/Creatinine Ratio, Urine    Hemoglobin A1C    TSH    B12 deficiency     Starting oral b12         Relevant Orders    Vitamin B12       GI    Dysphagia - Primary     Uses dr. Vallecillo for GI procedures and is s/p esophageal dilation         Gastroesophageal reflux disease without esophagitis     Sees GI and stable on bid famotidine.             Other    Low serum vitamin D     Taking vitamin D          Relevant Orders    Vitamin D     Other Visit Diagnoses       Encounter for lipid screening for cardiovascular disease        Relevant Orders    Lipid Panel    Vitamin D deficiency        Relevant Orders    Vitamin D

## 2024-03-20 DIAGNOSIS — Z78.0 MENOPAUSE: ICD-10-CM

## 2024-06-10 ENCOUNTER — OFFICE VISIT (OUTPATIENT)
Dept: URGENT CARE | Facility: CLINIC | Age: 84
End: 2024-06-10
Payer: MEDICARE

## 2024-06-10 VITALS
OXYGEN SATURATION: 97 % | WEIGHT: 114 LBS | SYSTOLIC BLOOD PRESSURE: 137 MMHG | HEIGHT: 65 IN | RESPIRATION RATE: 18 BRPM | DIASTOLIC BLOOD PRESSURE: 77 MMHG | HEART RATE: 83 BPM | TEMPERATURE: 98 F | BODY MASS INDEX: 18.99 KG/M2

## 2024-06-10 DIAGNOSIS — R10.9 FLANK PAIN: ICD-10-CM

## 2024-06-10 DIAGNOSIS — M25.512 ACUTE PAIN OF LEFT SHOULDER: ICD-10-CM

## 2024-06-10 DIAGNOSIS — M54.13 RADICULOPATHY, CERVICOTHORACIC REGION: Primary | ICD-10-CM

## 2024-06-10 PROCEDURE — 99213 OFFICE O/P EST LOW 20 MIN: CPT | Mod: S$GLB,,,

## 2024-06-10 NOTE — PATIENT INSTRUCTIONS
Pain relief -- Acetaminophen (sample brand name: Tylenol) or a nonsteroidal antiinflammatory medication (NSAID) such as ibuprofen (sample brand names: Advil, Motrin) or naproxen (sample brand names: Aleve, Naprosyn) may help relieve mild to moderate neck pain.  Ice -- For some people, ice applied to the sore area can help relieve neck pain. In general, for acute injuries, ice is recommended as the initial treatment, especially if swelling is present.  You can also relieve muscle tightness by placing a bag of ice, bag of frozen vegetables, or a frozen towel to the painful area. The cold source should be wrapped in a thin dry  before it is placed on the neck to protect the skin. The ice or cold pack should be left in place for 15 to 20 minutes to deeply penetrate the tissues; this can be repeated every two to four hours until symptoms improve.  Skin damage can result from excessive use of ice, especially in people with poor skin sensation; it's a good idea to inspect your skin each time you apply ice. Look for changes in pigmentation (for example, lighter- or darker-colored areas) and let your health care provider know if you notice any problems.  Heat -- Heat can also help to reduce neck pain in some people. Apply moist heat for 10 to 15 minutes with a shower, hot bath, or moist towel warmed in a microwave. If you use a heated towel, be careful not to overheat, as this can cause skin injury.  Stretching exercises -- You can help restore and preserve your range of motion with exercises that stretch and strengthen the neck muscles. Range of motion exercises and stretching may help decrease pain from muscle injury. It is best to perform stretching exercises when the muscles are warm, such as after the application of heat, or after a few minutes of cardiovascular warm-up exercises.  Exercises can be done in the morning to relieve stiffness and again at night before going to bed. Expect mild, achy muscle pain  "if you are new to exercise. If you have sharp or "electric" type pain in your shoulder or arm, tell your provider.  Posture -- You can help prevent or reduce neck pain by doing activities and using positions that emphasize a neutral neck position and minimize tension across the supporting muscles and ligaments. Extremes of range of motion and positions that cause constant tension should be minimized or avoided.  It may help to:  ?Avoid sitting in the same position for prolonged periods of time. If you work at a desk, try to take periodic five-minute breaks throughout the day. Avoid looking up or down at a computer monitor; adjust it to your eye level. Visual changes or new glasses prescriptions can also affect the way you hold your head and neck to read.  ?Avoid putting pressure on your upper back with things like backpacks, over-the-shoulder purses, or carrying children. Alternatives may include wheeled backpacks or cases and having a child walk or ride in a stroller.  ?Avoid doing overhead work for prolonged periods at a time.  ?Maintain good posture by holding your head up and keeping your shoulders back and down. Try to keep your neck in line with your body and avoid hunching forward. Pay attention to your neck and arm position when you are using your phone.  ?Use the car or chair arm rests to keep your arms supported. Find the optimal seated alignment in your car for your entire spine and make sure the head rest is at a comfortable position for your neck.  ?Sleep with your neck in a neutral position by using a small pillow under the nape of your neck (if you sleep on your back) or sleeping with enough pillows to keep your neck straight in line with your body (if you sleep on your side). If you sleep on your back, putting a pillow under the knees can help to flatten the spine and relax the neck muscles. Avoid sleeping on your stomach with your head turned to one side.  ?Carry heavy objects close to your body " rather than with outstretched arms.    You must understand that you've received an Urgent Care treatment only and that you may be released before all your medical problems are known or treated. You, the patient, will arrange for follow up care as instructed.  Follow up with your PCP or specialty clinic as directed in the next 1-2 weeks if not improved or as needed.  You can call [phone] to schedule an appointment with the appropriate provider.  If your condition worsens we recommend that you receive another evaluation at the emergency room immediately or contact your primary medical clinics after hours call service to discuss your concerns.  Please return here or go to the Emergency Department for any concerns or worsening of condition.  Please if you smoke please consider quitting. [facility] Smoke cessation hotline number is [phone], available at this number is free counseling and medications to live a healthier life!         If you were prescribed a narcotic or controlled medication, do not drive or operate heavy equipment or machinery while taking these medications.

## 2024-06-10 NOTE — PROGRESS NOTES
"Subjective:      Patient ID: Stacie Hudson is a 83 y.o. female.    Vitals:  height is 5' 5" (1.651 m) and weight is 51.7 kg (113 lb 15.7 oz). Her oral temperature is 98 °F (36.7 °C). Her blood pressure is 137/77 and her pulse is 83. Her respiration is 18 and oxygen saturation is 97%.     Chief Complaint: Shoulder Pain and Back Pain  Patient states that since yesterday she has been having pain in her left shoulder/neck area which radiates into her left arm going to her elbow. Patient states that the pain is a sharp pain and is intermittent. Patient states that the pain gets worse with certain movements. Patient denies any trauma or known cause.     Pt states not able to take steroids.       Constitution: Negative.   HENT: Negative.     Neck: neck negative.   Cardiovascular: Negative.    Eyes: Negative.    Respiratory: Negative.     Gastrointestinal: Negative.    Endocrine: negative.   Genitourinary: Negative.    Musculoskeletal:  Positive for joint pain.   Skin: Negative.    Allergic/Immunologic: Negative.    Neurological: Negative.    Hematologic/Lymphatic: Negative.    Psychiatric/Behavioral: Negative.        Objective:     Physical Exam   Constitutional: She is oriented to person, place, and time.   HENT:   Head: Normocephalic and atraumatic.   Nose: Nose normal.   Eyes: Conjunctivae are normal. Pupils are equal, round, and reactive to light. Extraocular movement intact   Neck: Neck supple.   Cardiovascular: Normal rate and normal pulses.   Pulmonary/Chest: Effort normal.   Abdominal: Normal appearance.   Musculoskeletal:      Left shoulder: She exhibits tenderness. She exhibits normal range of motion, no swelling, no crepitus and no deformity.        Arms:       Comments: Pain in area noted, no swelling or increased pain with palpation.    Neurological: no focal deficit. She is alert, oriented to person, place, and time and at baseline.   Skin: Skin is warm.   Psychiatric: Her behavior is normal. Mood, judgment " and thought content normal.   Nursing note and vitals reviewed.      Assessment:     1. Radiculopathy, cervicothoracic region    2. Flank pain    3. Acute pain of left shoulder        Plan:       Radiculopathy, cervicothoracic region    Flank pain  -     Cancel: POCT Urinalysis, Dipstick, Automated, W/O Scope    Acute pain of left shoulder              Discussed at home care with patient

## 2024-06-13 ENCOUNTER — OFFICE VISIT (OUTPATIENT)
Dept: FAMILY MEDICINE | Facility: CLINIC | Age: 84
End: 2024-06-13
Payer: MEDICARE

## 2024-06-13 VITALS
BODY MASS INDEX: 18.66 KG/M2 | WEIGHT: 112 LBS | RESPIRATION RATE: 18 BRPM | HEIGHT: 65 IN | DIASTOLIC BLOOD PRESSURE: 78 MMHG | SYSTOLIC BLOOD PRESSURE: 112 MMHG | OXYGEN SATURATION: 98 % | HEART RATE: 80 BPM

## 2024-06-13 DIAGNOSIS — Z87.440 HISTORY OF RECURRENT UTI (URINARY TRACT INFECTION): ICD-10-CM

## 2024-06-13 DIAGNOSIS — R82.998 LEUKOCYTES IN URINE: ICD-10-CM

## 2024-06-13 DIAGNOSIS — M54.6 ACUTE LEFT-SIDED THORACIC BACK PAIN: Primary | ICD-10-CM

## 2024-06-13 LAB
BILIRUBIN, UA POC OHS: NEGATIVE
BLOOD, UA POC OHS: NEGATIVE
CLARITY, UA POC OHS: CLEAR
COLOR, UA POC OHS: YELLOW
GLUCOSE, UA POC OHS: NEGATIVE
KETONES, UA POC OHS: NEGATIVE
LEUKOCYTES, UA POC OHS: ABNORMAL
NITRITE, UA POC OHS: NEGATIVE
PH, UA POC OHS: 7
PROTEIN, UA POC OHS: NEGATIVE
SPECIFIC GRAVITY, UA POC OHS: 1.02
UROBILINOGEN, UA POC OHS: 0.2

## 2024-06-13 PROCEDURE — 99999 PR PBB SHADOW E&M-EST. PATIENT-LVL IV: CPT | Mod: PBBFAC,,, | Performed by: NURSE PRACTITIONER

## 2024-06-13 PROCEDURE — 99999PBSHW POCT URINALYSIS(INSTRUMENT): Mod: PBBFAC,,,

## 2024-06-13 PROCEDURE — 99214 OFFICE O/P EST MOD 30 MIN: CPT | Mod: S$PBB,,, | Performed by: NURSE PRACTITIONER

## 2024-06-13 PROCEDURE — 81003 URINALYSIS AUTO W/O SCOPE: CPT | Mod: PBBFAC,PN | Performed by: NURSE PRACTITIONER

## 2024-06-13 PROCEDURE — 99214 OFFICE O/P EST MOD 30 MIN: CPT | Mod: PBBFAC,PN | Performed by: NURSE PRACTITIONER

## 2024-06-13 PROCEDURE — 87086 URINE CULTURE/COLONY COUNT: CPT | Performed by: NURSE PRACTITIONER

## 2024-06-13 NOTE — PROGRESS NOTES
Subjective:       Patient ID: Stacie Hudson is a 83 y.o. female.    Chief Complaint: Back Pain (1 week +)    Stacie Hudson presents to the clinic today for ongoing left sided mid back pain  Established within the clinic, new patient to myself    Under the care of the following:  PCP: Dr. Smith    Presented to the Urgent care of for back pain on 6/10/2024 that had started the day before.   Reports that the pain has been ongoing for about 2 weeks  Describes the pain as intermittent with onset being sharp/stabbing with a crescendo into a pressure/squeezing then resolution until the discomfort returns.   Reports pain is worse with lying down or taking deep breaths  Denies any known injury/trauma  Denies any recent heavy lifting/pulling/pushing                 Review of Systems   Constitutional:  Negative for chills, fatigue and fever.   Respiratory:  Negative for cough, chest tightness, shortness of breath and wheezing.    Cardiovascular:  Negative for chest pain and palpitations.   Gastrointestinal:  Negative for abdominal pain, constipation, diarrhea and rectal pain.   Genitourinary:  Positive for flank pain. Negative for difficulty urinating, dysuria, frequency, pelvic pain and urgency.   Musculoskeletal:  Positive for arthralgias and back pain.       Patient Active Problem List   Diagnosis    Recurrent UTI    Dysphagia    Type 2 diabetes mellitus without complication, without long-term current use of insulin    Low serum vitamin D    B12 deficiency    Macular degeneration    Frequent PVCs    Gastroesophageal reflux disease without esophagitis       Objective:      Physical Exam  Constitutional:       General: She is not in acute distress.     Appearance: Normal appearance. She is not ill-appearing.   Eyes:      Conjunctiva/sclera: Conjunctivae normal.   Cardiovascular:      Rate and Rhythm: Normal rate and regular rhythm.      Heart sounds: Normal heart sounds. No murmur heard.  Pulmonary:      Effort: Pulmonary  "effort is normal. No respiratory distress.      Breath sounds: Normal breath sounds. No wheezing.   Musculoskeletal:      Thoracic back: Spasms and tenderness present.        Back:       Right lower leg: No edema.      Left lower leg: No edema.   Skin:     General: Skin is warm and dry.   Neurological:      General: No focal deficit present.      Mental Status: She is alert and oriented to person, place, and time.      Gait: Gait normal.   Psychiatric:         Mood and Affect: Mood normal.         Behavior: Behavior normal.         Thought Content: Thought content normal.         Judgment: Judgment normal.         Lab Results   Component Value Date    WBC 11.82 07/03/2023    HGB 10.5 (L) 07/03/2023    HCT 31.8 (L) 07/03/2023     07/03/2023    ALT 6 (L) 07/03/2023    AST 10 07/03/2023     07/03/2023    K 3.9 07/03/2023     07/03/2023    CREATININE 0.9 07/03/2023    BUN 15 07/03/2023    CO2 19 (L) 07/03/2023     The ASCVD Risk score (Brad DK, et al., 2019) failed to calculate for the following reasons:    The 2019 ASCVD risk score is only valid for ages 40 to 79  Visit Vitals  /78 (BP Location: Left arm, Patient Position: Sitting, BP Method: Medium (Manual))   Pulse 80   Resp 18   Ht 5' 5" (1.651 m)   Wt 50.8 kg (112 lb)   SpO2 98%   BMI 18.64 kg/m²      Assessment:       1. Acute left-sided thoracic back pain    2. History of recurrent UTI (urinary tract infection)    3. Leukocytes in urine        Plan:       1. Acute left-sided thoracic back pain  -     POCT Urinalysis(Instrument)  -     X-Ray Thoracic Spine AP Lateral; Future; Expected date: 06/13/2024  -     Comprehensive Metabolic Panel; Future; Expected date: 06/13/2024  -     CBC Auto Differential; Future; Expected date: 06/13/2024  Obtain x ray/blood work for review.  Maintain hydration  Ice/heat, topicals   Worsening s/sx- ER   2. History of recurrent UTI (urinary tract infection)  -     Comprehensive Metabolic Panel; Future; " Expected date: 06/13/2024  -     CBC Auto Differential; Future; Expected date: 06/13/2024    3. Leukocytes in urine  -     Urine culture       No follow-ups on file.      Future Appointments       Date Provider Specialty Appt Notes    6/14/2024  Lab lab    6/14/2024  Radiology     9/11/2024  Lab fasting labs    9/18/2024 Shu Smith MD Family Medicine 6m f/u

## 2024-06-14 ENCOUNTER — OFFICE VISIT (OUTPATIENT)
Dept: FAMILY MEDICINE | Facility: CLINIC | Age: 84
End: 2024-06-14
Payer: MEDICARE

## 2024-06-14 VITALS
WEIGHT: 112 LBS | DIASTOLIC BLOOD PRESSURE: 68 MMHG | SYSTOLIC BLOOD PRESSURE: 110 MMHG | HEIGHT: 65 IN | BODY MASS INDEX: 18.66 KG/M2 | OXYGEN SATURATION: 98 % | RESPIRATION RATE: 16 BRPM | HEART RATE: 79 BPM

## 2024-06-14 DIAGNOSIS — B02.9 HERPES ZOSTER WITHOUT COMPLICATION: Primary | ICD-10-CM

## 2024-06-14 PROCEDURE — 99999 PR PBB SHADOW E&M-EST. PATIENT-LVL III: CPT | Mod: PBBFAC,,, | Performed by: NURSE PRACTITIONER

## 2024-06-14 PROCEDURE — 99213 OFFICE O/P EST LOW 20 MIN: CPT | Mod: PBBFAC,PN | Performed by: NURSE PRACTITIONER

## 2024-06-14 PROCEDURE — 99213 OFFICE O/P EST LOW 20 MIN: CPT | Mod: S$PBB,,, | Performed by: NURSE PRACTITIONER

## 2024-06-14 RX ORDER — VALACYCLOVIR HYDROCHLORIDE 1 G/1
1000 TABLET, FILM COATED ORAL EVERY 8 HOURS
Qty: 21 TABLET | Refills: 0 | Status: SHIPPED | OUTPATIENT
Start: 2024-06-14 | End: 2024-06-21

## 2024-06-14 RX ORDER — LIDOCAINE HYDROCHLORIDE 20 MG/ML
JELLY TOPICAL 3 TIMES DAILY PRN
Qty: 15 ML | Refills: 0 | Status: SHIPPED | OUTPATIENT
Start: 2024-06-14

## 2024-06-14 RX ORDER — GABAPENTIN 300 MG/1
300 CAPSULE ORAL 3 TIMES DAILY
Qty: 42 CAPSULE | Refills: 0 | Status: SHIPPED | OUTPATIENT
Start: 2024-06-14 | End: 2024-06-17

## 2024-06-14 NOTE — PROGRESS NOTES
Subjective:       Patient ID: Stacie Hudson is a 83 y.o. female.    Chief Complaint: Possible Shingles       Stacie Hudson presents to the clinic today with her significant other for follow up on her reported left sided mid back pain.  Expresses concerns for possible shingles as significant other noticed rash to back last night.    Under the care of the following:  PCP: Dr. Smith     Presented to the Urgent care of for back pain on 6/10/2024 that had started the day before.   Reports that the pain has been ongoing for about 2 weeks  Describes the pain as intermittent with onset being sharp/stabbing with a crescendo into a pressure/squeezing then resolution until the discomfort returns.   Reports pain is worse with lying down or taking deep breaths  Denies any known injury/trauma  Denies any recent heavy lifting/pulling/pushing    Significant other reports last night he saw a red blister like rash to patient's back upon changing.  Stacie denies history of shingles, reports was unaware of rash, but reports that she noticed change to discomfort yesterday afternoon to include more of a burning/stinging sensation.       Review of Systems   Constitutional:  Negative for chills, fatigue and fever.   Respiratory:  Negative for cough, chest tightness, shortness of breath and wheezing.    Cardiovascular:  Negative for chest pain and palpitations.   Gastrointestinal:  Negative for abdominal pain, constipation, diarrhea and rectal pain.   Genitourinary:  Negative for difficulty urinating, dysuria, flank pain, frequency, pelvic pain and urgency.   Musculoskeletal:  Positive for arthralgias and back pain.   Skin:  Positive for rash. Negative for wound.       Patient Active Problem List   Diagnosis    Recurrent UTI    Dysphagia    Type 2 diabetes mellitus without complication, without long-term current use of insulin    Low serum vitamin D    B12 deficiency    Macular degeneration    Frequent PVCs    Gastroesophageal reflux disease  "without esophagitis       Objective:      Physical Exam  Constitutional:       General: She is not in acute distress.     Appearance: Normal appearance. She is not ill-appearing.   Pulmonary:      Effort: Pulmonary effort is normal. No respiratory distress.   Skin:     Findings: Rash present. Rash is vesicular.             Comments: Left lateral thoracic region and extends beneath the left arm to underside of left breast.    Neurological:      Mental Status: She is alert and oriented to person, place, and time.   Psychiatric:         Mood and Affect: Mood normal.         Behavior: Behavior normal.         Lab Results   Component Value Date    WBC 11.82 07/03/2023    HGB 10.5 (L) 07/03/2023    HCT 31.8 (L) 07/03/2023     07/03/2023    ALT 6 (L) 07/03/2023    AST 10 07/03/2023     07/03/2023    K 3.9 07/03/2023     07/03/2023    CREATININE 0.9 07/03/2023    BUN 15 07/03/2023    CO2 19 (L) 07/03/2023     The ASCVD Risk score (Brad DK, et al., 2019) failed to calculate for the following reasons:    The 2019 ASCVD risk score is only valid for ages 40 to 79  Visit Vitals  /68 (BP Location: Left arm, Patient Position: Sitting, BP Method: Medium (Manual))   Pulse 79   Resp 16   Ht 5' 5" (1.651 m)   Wt 50.8 kg (112 lb)   SpO2 98%   BMI 18.64 kg/m²      Assessment:       1. Herpes zoster without complication        Plan:       1. Herpes zoster without complication  -     gabapentin (NEURONTIN) 300 MG capsule; Take 1 capsule (300 mg total) by mouth 3 (three) times daily. for 14 days  Dispense: 42 capsule; Refill: 0  -     LIDOcaine HCL 2% (XYLOCAINE) 2 % jelly; Apply topically 3 (three) times daily as needed (pain).  Dispense: 15 mL; Refill: 0  -     valACYclovir (VALTREX) 1000 MG tablet; Take 1 tablet (1,000 mg total) by mouth every 8 (eight) hours. for 7 days  Dispense: 21 tablet; Refill: 0  Compresses  Take medications as prescribed- may cause drowsiness  RTC 10-14 days for follow up- discussed " that pain/discomfort can outlast the rash      No follow-ups on file.      Future Appointments       Date Provider Specialty Appt Notes    6/25/2024 Janae Keller, [facility] Family Medicine 2 week follow up    9/11/2024  Lab fasting labs    9/18/2024 Shu Smith MD Family Medicine 6m f/u

## 2024-06-14 NOTE — PATIENT INSTRUCTIONS
Advise patients to keep the rash clean and dry to reduce the risk of bacterial superinfection. Patients should also avoid topical antibiotics or dressings with adhesive that may cause irritation and may delay healing of the rash. In addition, they should avoid wearing clothing made from irritating fibers (e.g., wool).  patients about the possible complication of postherpetic pain and offer advice on how to psychologically manage chronic pain (e.g., with relaxation techniques and counseling). Referral to a pain management specialist is indicated if the pain interferes with daily living.

## 2024-06-15 LAB
BACTERIA UR CULT: NORMAL
BACTERIA UR CULT: NORMAL

## 2024-06-17 ENCOUNTER — TELEPHONE (OUTPATIENT)
Dept: FAMILY MEDICINE | Facility: CLINIC | Age: 84
End: 2024-06-17
Payer: MEDICARE

## 2024-06-17 DIAGNOSIS — B02.9 HERPES ZOSTER WITHOUT COMPLICATION: Primary | ICD-10-CM

## 2024-06-17 RX ORDER — GABAPENTIN 100 MG/1
100 CAPSULE ORAL 2 TIMES DAILY
Qty: 60 CAPSULE | Refills: 11 | Status: SHIPPED | OUTPATIENT
Start: 2024-06-17 | End: 2025-06-17

## 2024-06-17 NOTE — TELEPHONE ENCOUNTER
----- Message from Mya Oliveira sent at 6/17/2024  8:09 AM CDT -----  Contact: pt [phone]  Type: Needs Medical Advice  Who Called:  Pt     Best Call Back Number: [phone]        Additional Information: Pt stated when she takes gabapentin (NEURONTIN) 300 MG capsule it makes her sway while walking and then she falls downs. Pt stated she has stopped taking the gabapentin but needs a lower dosage of meds or a different rx.. Pls call back and advise

## 2024-06-17 NOTE — TELEPHONE ENCOUNTER
Pt c/o gabapentin (NEURONTIN) 300 MG capsule sway with walking, then she falls downs. Pt stopped taking the gabapentin requesting  a lower dosage of meds or a different rx.. Please advise pt seen 6/14/24

## 2024-06-24 PROBLEM — N39.0 RECURRENT UTI: Status: RESOLVED | Noted: 2019-05-31 | Resolved: 2024-06-24

## 2024-06-25 ENCOUNTER — OFFICE VISIT (OUTPATIENT)
Dept: FAMILY MEDICINE | Facility: CLINIC | Age: 84
End: 2024-06-25
Payer: MEDICARE

## 2024-06-25 VITALS
OXYGEN SATURATION: 98 % | BODY MASS INDEX: 18.49 KG/M2 | RESPIRATION RATE: 16 BRPM | HEIGHT: 65 IN | WEIGHT: 111 LBS | SYSTOLIC BLOOD PRESSURE: 110 MMHG | HEART RATE: 76 BPM | DIASTOLIC BLOOD PRESSURE: 72 MMHG

## 2024-06-25 DIAGNOSIS — B02.9 HERPES ZOSTER WITHOUT COMPLICATION: Primary | ICD-10-CM

## 2024-06-25 PROCEDURE — 99213 OFFICE O/P EST LOW 20 MIN: CPT | Mod: S$PBB,,, | Performed by: NURSE PRACTITIONER

## 2024-06-25 PROCEDURE — 99999 PR PBB SHADOW E&M-EST. PATIENT-LVL IV: CPT | Mod: PBBFAC,,, | Performed by: NURSE PRACTITIONER

## 2024-06-25 PROCEDURE — 99214 OFFICE O/P EST MOD 30 MIN: CPT | Mod: PBBFAC,PN | Performed by: NURSE PRACTITIONER

## 2024-06-25 NOTE — PROGRESS NOTES
Subjective:       Patient ID: Stacie Hudson is a 83 y.o. female.    Chief Complaint: Follow-up (2 week f/u)    Stacie Hudson presents to the clinic today for 2 week follow up      Under the care of the following:  PCP: Dr. Smith    Patient Active Problem List  Diagnosis  · Dysphagia  · Type 2 diabetes mellitus without complication, without long-term current use of insulin  · Low serum vitamin D  · B12 deficiency  · Macular degeneration  · Frequent PVCs  · Gastroesophageal reflux disease without esophagitis      Presented to the Urgent care of for back pain on 6/10/2024 that had started the day before.   Reports that the pain has been ongoing for about 2 weeks  Describes the pain as intermittent with onset being sharp/stabbing with a crescendo into a pressure/squeezing then resolution until the discomfort returns.   Reports pain is worse with lying down or taking deep breaths  Denies any known injury/trauma  Denies any recent heavy lifting/pulling/pushing    Returned to the clinic 6/11/2024 accompanied by her significant other who reported a  red blister like rash to patient's back upon changing.  Stacie denies history of shingles, reports was unaware of rash, but reports that she noticed change to discomfort yesterday afternoon to include more of a burning/stinging sensation.   She was prescribed Gabapentin 300 mg, but this caused gait instability- lower dose was prescribed.     Reports that she continues to experience burning like discomfort.   Tolerating lower dose Gabapentin-  Using topical lidocaine as needed for discomfort.                Review of Systems    Patient Active Problem List   Diagnosis    Dysphagia    Type 2 diabetes mellitus without complication, without long-term current use of insulin    Low serum vitamin D    B12 deficiency    Macular degeneration    Frequent PVCs    Gastroesophageal reflux disease without esophagitis       Objective:      Physical Exam  Constitutional:       Appearance: Normal  "appearance.   Cardiovascular:      Rate and Rhythm: Normal rate and regular rhythm.      Heart sounds: Normal heart sounds. No murmur heard.  Pulmonary:      Effort: Pulmonary effort is normal. No respiratory distress.      Breath sounds: Normal breath sounds.   Skin:     General: Skin is warm and dry.      Findings: Rash present.      Comments: See attached image    Neurological:      Mental Status: She is alert and oriented to person, place, and time.   Psychiatric:         Mood and Affect: Mood normal.         Behavior: Behavior normal.               Lab Results   Component Value Date    WBC 11.82 07/03/2023    HGB 10.5 (L) 07/03/2023    HCT 31.8 (L) 07/03/2023     07/03/2023    ALT 6 (L) 07/03/2023    AST 10 07/03/2023     07/03/2023    K 3.9 07/03/2023     07/03/2023    CREATININE 0.9 07/03/2023    BUN 15 07/03/2023    CO2 19 (L) 07/03/2023     The ASCVD Risk score (Brad DK, et al., 2019) failed to calculate for the following reasons:    The 2019 ASCVD risk score is only valid for ages 40 to 79  Visit Vitals  /72 (BP Location: Right arm, Patient Position: Sitting, BP Method: Medium (Manual))   Pulse 76   Resp 16   Ht 5' 5" (1.651 m)   Wt 50.3 kg (111 lb)   SpO2 98%   BMI 18.47 kg/m²      Assessment:       1. Herpes zoster without complication        Plan:       1. Herpes zoster without complication  Continue supportive/conservative treatment  Continue to use gabapentin- may cause drowsiness  Continue topical lidocaine  RTC 3 weeks f/u      No follow-ups on file.      Future Appointments       Date Provider Specialty Appt Notes    7/16/2024 Janae Keller NP Family Medicine 3 weeks f/u - Shingles    9/11/2024  Lab fasting labs    9/18/2024 Shu Smith MD Family Medicine 6m f/u             "

## 2024-07-08 ENCOUNTER — OFFICE VISIT (OUTPATIENT)
Dept: FAMILY MEDICINE | Facility: CLINIC | Age: 84
End: 2024-07-08
Payer: MEDICARE

## 2024-07-08 VITALS
SYSTOLIC BLOOD PRESSURE: 110 MMHG | OXYGEN SATURATION: 99 % | DIASTOLIC BLOOD PRESSURE: 72 MMHG | WEIGHT: 109 LBS | BODY MASS INDEX: 18.16 KG/M2 | HEIGHT: 65 IN | HEART RATE: 78 BPM | RESPIRATION RATE: 16 BRPM

## 2024-07-08 DIAGNOSIS — B02.9 HERPES ZOSTER WITHOUT COMPLICATION: Primary | ICD-10-CM

## 2024-07-08 PROCEDURE — 99212 OFFICE O/P EST SF 10 MIN: CPT | Mod: S$PBB,,, | Performed by: NURSE PRACTITIONER

## 2024-07-08 PROCEDURE — 99999 PR PBB SHADOW E&M-EST. PATIENT-LVL III: CPT | Mod: PBBFAC,,, | Performed by: NURSE PRACTITIONER

## 2024-07-08 PROCEDURE — 99213 OFFICE O/P EST LOW 20 MIN: CPT | Mod: PBBFAC,PN | Performed by: NURSE PRACTITIONER

## 2024-07-08 NOTE — PROGRESS NOTES
Subjective:       Patient ID: Stacie Hudson is a 83 y.o. female.    Chief Complaint: Herpes Zoster    Stacie Hudson presents to the clinic today for 2 week follow up on shingles      Under the care of the following:  PCP: Dr. Smith     Patient Active Problem List  Diagnosis  ·Dysphagia  ·Type 2 diabetes mellitus without complication, without long-term current use of insulin  ·Low serum vitamin D  ·B12 deficiency  ·Macular degeneration  ·Frequent PVCs  ·Gastroesophageal reflux disease without esophagitis        Presented to the Urgent care of for back pain on 6/10/2024 that had started the day before.   Reports that the pain has been ongoing for about 2 weeks  Describes the pain as intermittent with onset being sharp/stabbing with a crescendo into a pressure/squeezing then resolution until the discomfort returns.   Reports pain is worse with lying down or taking deep breaths  Denies any known injury/trauma  Denies any recent heavy lifting/pulling/pushing     Returned to the clinic 6/11/2024 accompanied by her significant other who reported a  red blister like rash to patient's back upon changing.  Stacie denies history of shingles, reports was unaware of rash, but reports that she noticed change to discomfort yesterday afternoon to include more of a burning/stinging sensation.   She was prescribed Gabapentin 300 mg, but this caused gait instability- lower dose was prescribed.      Reports that she continues to experience burning like discomfort.   Tolerating lower dose Gabapentin:   - has only been using as needed.   Using topical lidocaine as needed for discomfort.   - reports pain mostly at night when lying down.           Review of Systems    Patient Active Problem List   Diagnosis    Dysphagia    Type 2 diabetes mellitus without complication, without long-term current use of insulin    Low serum vitamin D    B12 deficiency    Macular degeneration    Frequent PVCs    Gastroesophageal reflux disease without  "esophagitis       Objective:      Physical Exam  Constitutional:       Appearance: Normal appearance.   Cardiovascular:      Rate and Rhythm: Normal rate and regular rhythm.      Heart sounds: Normal heart sounds. No murmur heard.  Pulmonary:      Effort: Pulmonary effort is normal. No respiratory distress.      Breath sounds: Normal breath sounds.   Skin:     General: Skin is warm and dry.      Findings: Rash present.      Comments: See attached image    Neurological:      Mental Status: She is alert and oriented to person, place, and time.   Psychiatric:         Mood and Affect: Mood normal.         Behavior: Behavior normal.         Lab Results   Component Value Date    WBC 11.82 07/03/2023    HGB 10.5 (L) 07/03/2023    HCT 31.8 (L) 07/03/2023     07/03/2023    ALT 6 (L) 07/03/2023    AST 10 07/03/2023     07/03/2023    K 3.9 07/03/2023     07/03/2023    CREATININE 0.9 07/03/2023    BUN 15 07/03/2023    CO2 19 (L) 07/03/2023     The ASCVD Risk score (Brad DK, et al., 2019) failed to calculate for the following reasons:    The 2019 ASCVD risk score is only valid for ages 40 to 79  Visit Vitals  /72 (BP Location: Right arm, Patient Position: Sitting, BP Method: Medium (Manual))   Pulse 78   Resp 16   Ht 5' 5" (1.651 m)   Wt 49.4 kg (109 lb)   SpO2 99%   BMI 18.14 kg/m²      Assessment:       1. Herpes zoster without complication        Plan:       1. Herpes zoster without complication  Continue supportive/conservative treatment  Continue to use gabapentin- may cause drowsiness, can increase to 200 mg at bedtime-   Continue topical lidocaine    No follow-ups on file.      Future Appointments       Date Provider Specialty Appt Notes    7/16/2024 Janae Keller NP Family Medicine 3 weeks f/u - Shingles    9/11/2024  Lab fasting labs    9/18/2024 Shu Smith MD Family Medicine 6m f/u             "

## 2024-07-11 DIAGNOSIS — B02.9 HERPES ZOSTER WITHOUT COMPLICATION: ICD-10-CM

## 2024-07-11 RX ORDER — LIDOCAINE HYDROCHLORIDE 20 MG/ML
JELLY TOPICAL 3 TIMES DAILY PRN
Qty: 30 ML | Refills: 0 | Status: SHIPPED | OUTPATIENT
Start: 2024-07-11

## 2024-07-12 NOTE — TELEPHONE ENCOUNTER
Refill Routing Note   Medication(s) are not appropriate for processing by [facility] for the following reason(s):        Outside of protocol  New or recently adjusted medication  Med affordability  No active prescription written by provider    [facility] action(s):  Route  Defer      Medication Therapy Plan: pharmacy requesting change to available product size. 30 mL pended for provider review      Appointments  past 12m or future 3m with PCP    Date Provider   Last Visit   3/18/2024 Shu Smith MD   Next Visit   9/18/2024 Shu Smith MD   ED visits in past 90 days: 0        Note composed:7:31 PM 07/11/2024

## 2024-07-16 ENCOUNTER — OFFICE VISIT (OUTPATIENT)
Dept: FAMILY MEDICINE | Facility: CLINIC | Age: 84
End: 2024-07-16
Payer: MEDICARE

## 2024-07-16 VITALS
SYSTOLIC BLOOD PRESSURE: 110 MMHG | HEART RATE: 76 BPM | RESPIRATION RATE: 16 BRPM | HEIGHT: 65 IN | WEIGHT: 109 LBS | OXYGEN SATURATION: 99 % | DIASTOLIC BLOOD PRESSURE: 74 MMHG | BODY MASS INDEX: 18.16 KG/M2

## 2024-07-16 DIAGNOSIS — B02.9 HERPES ZOSTER WITHOUT COMPLICATION: Primary | ICD-10-CM

## 2024-07-16 PROCEDURE — 99999 PR PBB SHADOW E&M-EST. PATIENT-LVL III: CPT | Mod: PBBFAC,,, | Performed by: NURSE PRACTITIONER

## 2024-07-16 PROCEDURE — 99212 OFFICE O/P EST SF 10 MIN: CPT | Mod: S$PBB,,, | Performed by: NURSE PRACTITIONER

## 2024-07-16 PROCEDURE — 99213 OFFICE O/P EST LOW 20 MIN: CPT | Mod: PBBFAC,PN | Performed by: NURSE PRACTITIONER

## 2024-07-16 NOTE — PROGRESS NOTES
Subjective:       Patient ID: Stacie Hudson is a 83 y.o. female.    Chief Complaint: No chief complaint on file.    Stacie Hudson presents to the clinic today for 2 week follow up on shingles      Under the care of the following:  PCP: Dr. Smith     Patient Active Problem List  Diagnosis  ·Dysphagia  ·Type 2 diabetes mellitus without complication, without long-term current use of insulin  ·Low serum vitamin D  ·B12 deficiency  ·Macular degeneration  ·Frequent PVCs  ·Gastroesophageal reflux disease without esophagitis        Presented to the Urgent care of for back pain on 6/10/2024 that had started the day before.   Reports that the pain has been ongoing for about 2 weeks  Describes the pain as intermittent with onset being sharp/stabbing with a crescendo into a pressure/squeezing then resolution until the discomfort returns.   Reports pain is worse with lying down or taking deep breaths  Denies any known injury/trauma  Denies any recent heavy lifting/pulling/pushing     Returned to the clinic 6/11/2024 accompanied by her significant other who reported a  red blister like rash to patient's back upon changing.  Stacie denies history of shingles, reports was unaware of rash, but reports that she noticed change to discomfort yesterday afternoon to include more of a burning/stinging sensation.   She was prescribed Gabapentin 300 mg, but this caused gait instability- lower dose was prescribed.      Reports that she continues to experience burning like discomfort- describes as more as a lightening bolt like sensation that is intermittent.    Tolerating lower dose Gabapentin:   - has only been using as needed in the morning and taking 2 (200 mg) at bedtime.   Using topical lidocaine as needed for discomfort.   - reports pain mostly at night when lying down.            Review of Systems    Patient Active Problem List   Diagnosis    Dysphagia    Type 2 diabetes mellitus without complication, without long-term current use of  "insulin    Low serum vitamin D    B12 deficiency    Macular degeneration    Frequent PVCs    Gastroesophageal reflux disease without esophagitis       Objective:      Physical Exam  Constitutional:       Appearance: Normal appearance.   Cardiovascular:      Rate and Rhythm: Normal rate and regular rhythm.      Heart sounds: Normal heart sounds. No murmur heard.  Pulmonary:      Effort: Pulmonary effort is normal. No respiratory distress.      Breath sounds: Normal breath sounds.   Skin:     General: Skin is warm and dry.      Findings: Rash present.      Comments: See attached image    Neurological:      Mental Status: She is alert and oriented to person, place, and time.   Psychiatric:         Mood and Affect: Mood normal.         Behavior: Behavior normal.               Lab Results   Component Value Date    WBC 11.82 07/03/2023    HGB 10.5 (L) 07/03/2023    HCT 31.8 (L) 07/03/2023     07/03/2023    ALT 6 (L) 07/03/2023    AST 10 07/03/2023     07/03/2023    K 3.9 07/03/2023     07/03/2023    CREATININE 0.9 07/03/2023    BUN 15 07/03/2023    CO2 19 (L) 07/03/2023     The ASCVD Risk score ([place] DK, et al., 2019) failed to calculate for the following reasons:    The 2019 ASCVD risk score is only valid for ages 40 to 79  Visit Vitals  /74 (BP Location: Right arm, Patient Position: Sitting, BP Method: Medium (Manual))   Pulse 76   Resp 16   Ht 5' 5" (1.651 m)   Wt 49.4 kg (109 lb)   SpO2 99%   BMI 18.14 kg/m²      Assessment:       1. Herpes zoster without complication        Plan:       1. Herpes zoster without complication  Continue supportive/conservative treatment  Continue to use gabapentin- may cause drowsiness  Continue topical lidocaine     No follow-ups on file.      Future Appointments       Date Provider Specialty Appt Notes    8/16/2024 Janae Keller NP Family Medicine 1m f/u    9/11/2024  Lab fasting labs    9/18/2024 Shu Smith MD Family Medicine 6m f/u         "

## 2024-07-22 ENCOUNTER — TELEPHONE (OUTPATIENT)
Dept: FAMILY MEDICINE | Facility: CLINIC | Age: 84
End: 2024-07-22
Payer: MEDICARE

## 2024-07-22 DIAGNOSIS — B02.9 HERPES ZOSTER WITHOUT COMPLICATION: Primary | ICD-10-CM

## 2024-07-22 RX ORDER — VALACYCLOVIR HYDROCHLORIDE 1 G/1
1000 TABLET, FILM COATED ORAL EVERY 8 HOURS
Qty: 21 TABLET | Refills: 0 | Status: SHIPPED | OUTPATIENT
Start: 2024-07-22 | End: 2024-07-29

## 2024-07-22 NOTE — TELEPHONE ENCOUNTER
Pt informed again discomfort, burning/tingling, shooting sensations she was experiencing could last for weeks following the resolution of the rash.   Pt requesting another prescription for the anti-viral.    Thank you

## 2024-07-22 NOTE — TELEPHONE ENCOUNTER
Patient was seen in the clinic 7/16/2024 and there had been a progressive improvement in the resolution of the rash. We discussed that unfortunately any discomfort, burning/tingling, shooting sensations she was experiencing could last for weeks following the resolution of the rash.    She is currently taking Gabapentin which is used for the treatment of post-herpetic pain.   We can try a repeat of of her previously prescribed anti-viral (Valtrex).

## 2024-07-22 NOTE — TELEPHONE ENCOUNTER
----- Message from Danielle Bates sent at 7/22/2024 12:09 PM CDT -----  Contact: Patient  Type:  Same Day Appointment Request    Caller is requesting a same day appointment.  Caller declined first available appointment listed below.      Name of Caller:  Patient  When is the first available appointment?  N/A    Symptoms: Shingles - still actibe    Best Call Back Number:  [phone]    Additional Information:   States she would like to speak with someone to be seen as quickly as possible - states shingles need to be looked at - please call - thank you

## 2024-07-22 NOTE — TELEPHONE ENCOUNTER
"Spoke with pt. Pt seen in office with Janae Keller NP 7/16/24 due to  Herpes Zoster   Pt states, " I am no better. What does she want me to do. It is not going away" please advise if you would like to see pt back in office  "

## 2024-08-16 ENCOUNTER — OFFICE VISIT (OUTPATIENT)
Dept: FAMILY MEDICINE | Facility: CLINIC | Age: 84
End: 2024-08-16
Payer: MEDICARE

## 2024-08-16 VITALS
BODY MASS INDEX: 18.21 KG/M2 | OXYGEN SATURATION: 97 % | HEIGHT: 65 IN | DIASTOLIC BLOOD PRESSURE: 76 MMHG | HEART RATE: 71 BPM | SYSTOLIC BLOOD PRESSURE: 114 MMHG | RESPIRATION RATE: 12 BRPM | WEIGHT: 109.31 LBS

## 2024-08-16 DIAGNOSIS — B02.9 HERPES ZOSTER WITHOUT COMPLICATION: Primary | ICD-10-CM

## 2024-08-16 DIAGNOSIS — K21.9 GASTROESOPHAGEAL REFLUX DISEASE WITHOUT ESOPHAGITIS: ICD-10-CM

## 2024-08-16 PROCEDURE — 99213 OFFICE O/P EST LOW 20 MIN: CPT | Mod: PBBFAC,PN | Performed by: NURSE PRACTITIONER

## 2024-08-16 PROCEDURE — 99999 PR PBB SHADOW E&M-EST. PATIENT-LVL III: CPT | Mod: PBBFAC,,, | Performed by: NURSE PRACTITIONER

## 2024-08-16 RX ORDER — FAMOTIDINE 20 MG/1
20 TABLET, FILM COATED ORAL 2 TIMES DAILY
Qty: 180 TABLET | Refills: 1 | Status: SHIPPED | OUTPATIENT
Start: 2024-08-16 | End: 2024-08-16

## 2024-08-16 RX ORDER — FAMOTIDINE 20 MG/1
20 TABLET, FILM COATED ORAL 2 TIMES DAILY
Qty: 180 TABLET | Refills: 1 | Status: SHIPPED | OUTPATIENT
Start: 2024-08-16

## 2024-08-16 RX ORDER — ALOGLIPTIN 25 MG/1
1 TABLET, FILM COATED ORAL
COMMUNITY
Start: 2024-08-14

## 2024-08-16 NOTE — PROGRESS NOTES
Subjective:       Patient ID: Stacie Hudson is a 84 y.o. female.    Chief Complaint: Herpes Zoster (Still having symptoms)       Stacie Hudson presents to the clinic today for 1 month follow up on shingles      Under the care of the following:  PCP: Dr. Smith     Patient Active Problem List  Diagnosis  ·Dysphagia  ·Type 2 diabetes mellitus without complication, without long-term current use of insulin  ·Low serum vitamin D  ·B12 deficiency  ·Macular degeneration  ·Frequent PVCs  ·Gastroesophageal reflux disease without esophagitis     HPI:   Presented to the Urgent care of for back pain on 6/10/2024 that had started the day before.   Reports that the pain has been ongoing for about 2 weeks  Describes the pain as intermittent with onset being sharp/stabbing with a crescendo into a pressure/squeezing then resolution until the discomfort returns.   Reports pain is worse with lying down or taking deep breaths  Denies any known injury/trauma  Denies any recent heavy lifting/pulling/pushing     Returned to the clinic 6/11/2024 accompanied by her significant other who reported a  red blister like rash to patient's back upon changing.  Stacie denies history of shingles, reports was unaware of rash, but reports that she noticed change to discomfort yesterday afternoon to include more of a burning/stinging sensation.   She was prescribed Gabapentin 300 mg, but this caused gait instability- lower dose was prescribed.      7/16/2024:   Reported continued burning like discomfort- described as more of  a lightening bolt like sensation that is intermittent.    Tolerating lower dose Gabapentin:   - has only been using as needed in the morning and taking 2 (200 mg) at bedtime.   Using topical lidocaine as needed for discomfort.   - reports pain mostly at night when lying down.     Today reports that she is only using Gabapentin and topical Lidocaine as needed.   Still experiencing some discomfort that at times is itching and will vary  "to include a shock like sensation to burning discomfort.                  Review of Systems   Respiratory:  Negative for chest tightness and shortness of breath.    Cardiovascular:  Negative for chest pain and palpitations.   Skin:  Negative for rash.   Neurological:  Negative for dizziness, weakness and light-headedness.        Skin sensation disturbance r/t shingles        Patient Active Problem List   Diagnosis    Dysphagia    Type 2 diabetes mellitus without complication, without long-term current use of insulin    Low serum vitamin D    B12 deficiency    Macular degeneration    Frequent PVCs    Gastroesophageal reflux disease without esophagitis       Objective:      Physical Exam  Constitutional:       Appearance: Normal appearance.   Cardiovascular:      Rate and Rhythm: Normal rate and regular rhythm.      Heart sounds: Normal heart sounds. No murmur heard.  Pulmonary:      Effort: Pulmonary effort is normal. No respiratory distress.      Breath sounds: Normal breath sounds.   Skin:     General: Skin is warm and dry.      Findings: No rash.      Comments: See attached image    Neurological:      Mental Status: She is alert and oriented to person, place, and time.   Psychiatric:         Mood and Affect: Mood normal.         Behavior: Behavior normal.               Lab Results   Component Value Date    WBC 11.82 07/03/2023    HGB 10.5 (L) 07/03/2023    HCT 31.8 (L) 07/03/2023     07/03/2023    ALT 6 (L) 07/03/2023    AST 10 07/03/2023     07/03/2023    K 3.9 07/03/2023     07/03/2023    CREATININE 0.9 07/03/2023    BUN 15 07/03/2023    CO2 19 (L) 07/03/2023     The ASCVD Risk score (Levering DK, et al., 2019) failed to calculate for the following reasons:    The 2019 ASCVD risk score is only valid for ages 40 to 79  Visit Vitals  /76 (BP Location: Right arm, Patient Position: Sitting, BP Method: Medium (Manual))   Pulse 71   Resp 12   Ht 5' 5" (1.651 m)   Wt 49.6 kg (109 lb 4.8 oz) "   SpO2 97%   BMI 18.19 kg/m²      Assessment:       1. Herpes zoster without complication    2. Gastroesophageal reflux disease without esophagitis        Plan:       1. Herpes zoster without complication  Slowly improving.  Continue current regimen   2. Gastroesophageal reflux disease without esophagitis  -     famotidine (PEPCID) 20 MG tablet; Take 1 tablet (20 mg total) by mouth 2 (two) times daily.  Dispense: 180 tablet; Refill: 1  Needing refills.   Continue current regimen     Obtain fasting labs prior to next appointment  Rtc as scheduled for routine follow up/ shingles follow up      No follow-ups on file.      Future Appointments       Date Provider Specialty Appt Notes    9/11/2024  Lab fasting labs    9/18/2024 Shu Smith MD Family Medicine 6m f/u

## 2024-09-18 ENCOUNTER — OFFICE VISIT (OUTPATIENT)
Dept: FAMILY MEDICINE | Facility: CLINIC | Age: 84
End: 2024-09-18
Payer: MEDICARE

## 2024-09-18 VITALS
RESPIRATION RATE: 16 BRPM | OXYGEN SATURATION: 99 % | BODY MASS INDEX: 18.14 KG/M2 | WEIGHT: 108.88 LBS | SYSTOLIC BLOOD PRESSURE: 115 MMHG | HEART RATE: 79 BPM | HEIGHT: 65 IN | DIASTOLIC BLOOD PRESSURE: 62 MMHG

## 2024-09-18 DIAGNOSIS — R79.89 LOW SERUM VITAMIN D: ICD-10-CM

## 2024-09-18 DIAGNOSIS — E55.9 VITAMIN D DEFICIENCY: ICD-10-CM

## 2024-09-18 DIAGNOSIS — Z13.220 ENCOUNTER FOR LIPID SCREENING FOR CARDIOVASCULAR DISEASE: ICD-10-CM

## 2024-09-18 DIAGNOSIS — Z13.6 ENCOUNTER FOR LIPID SCREENING FOR CARDIOVASCULAR DISEASE: ICD-10-CM

## 2024-09-18 DIAGNOSIS — N39.0 RECURRENT UTI: Primary | ICD-10-CM

## 2024-09-18 DIAGNOSIS — E11.9 TYPE 2 DIABETES MELLITUS WITHOUT COMPLICATION, WITHOUT LONG-TERM CURRENT USE OF INSULIN: ICD-10-CM

## 2024-09-18 DIAGNOSIS — E53.8 B12 DEFICIENCY: ICD-10-CM

## 2024-09-18 DIAGNOSIS — B02.29 NEURALGIA, POSTHERPETIC: ICD-10-CM

## 2024-09-18 PROCEDURE — 99214 OFFICE O/P EST MOD 30 MIN: CPT | Mod: S$PBB,,, | Performed by: STUDENT IN AN ORGANIZED HEALTH CARE EDUCATION/TRAINING PROGRAM

## 2024-09-18 PROCEDURE — 99213 OFFICE O/P EST LOW 20 MIN: CPT | Mod: PBBFAC,PN | Performed by: STUDENT IN AN ORGANIZED HEALTH CARE EDUCATION/TRAINING PROGRAM

## 2024-09-18 PROCEDURE — 99999 PR PBB SHADOW E&M-EST. PATIENT-LVL III: CPT | Mod: PBBFAC,,, | Performed by: STUDENT IN AN ORGANIZED HEALTH CARE EDUCATION/TRAINING PROGRAM

## 2024-09-18 RX ORDER — NITROFURANTOIN MACROCRYSTALS 50 MG/1
CAPSULE ORAL
Qty: 45 CAPSULE | Refills: 1 | Status: SHIPPED | OUTPATIENT
Start: 2024-09-18

## 2024-09-18 NOTE — PROGRESS NOTES
Subjective:       Patient ID: Stacie Hudson is a 84 y.o. female.    Chief Complaint: Follow-up    History of chronic UTI  She was seeing urology  She needed to have dilation and was put on every other day macrobid  She has been out in about two weeks.  She needs a refill of this.    She reports glucoses are well controlled  She is in need of her bloodwork as the appointment was cancelled    Her shingles rash is better  Using prn aspercream and gabapentin with good control of postherpetic neuralgia        .  Patient Active Problem List   Diagnosis    Recurrent UTI    Dysphagia    Type 2 diabetes mellitus without complication, without long-term current use of insulin    Low serum vitamin D    B12 deficiency    Macular degeneration    Frequent PVCs    Gastroesophageal reflux disease without esophagitis    Neuralgia, postherpetic     Stacie has a current medication list which includes the following prescription(s): alogliptin, famotidine, gabapentin, lidocaine hcl 2%, metoprolol succinate, rosuvastatin, vit c/e/zn/coppr/lutein/zeaxan, vitamin d, and nitrofurantoin.    Review of Systems   Constitutional:  Negative for activity change and appetite change.   Respiratory:  Negative for shortness of breath.    Cardiovascular:  Negative for chest pain.   Gastrointestinal:  Negative for abdominal pain.   Genitourinary:  Negative for dysuria.   Integumentary:  Negative for rash.   Psychiatric/Behavioral:  Negative for sleep disturbance.          Health Maintenance Due   Topic Date Due    Lipid Panel  Never done    Pneumococcal Vaccines (Age 65+) (1 of 2 - PCV) Never done    TETANUS VACCINE  Never done    DEXA Scan  Never done    Shingles Vaccine (1 of 2) Never done    RSV Vaccine (Age 60+ and Pregnant patients) (1 - 1-dose 60+ series) Never done    Influenza Vaccine (1) 09/01/2024    COVID-19 Vaccine (4 - 2023-24 season) 09/01/2024      Health Maintenance reviewed and discussed- encouraged vaccines.     Objective:      Physical  Exam  Constitutional:       General: She is not in acute distress.     Appearance: Normal appearance. She is not ill-appearing.   Eyes:      Conjunctiva/sclera: Conjunctivae normal.   Cardiovascular:      Rate and Rhythm: Normal rate and regular rhythm.      Heart sounds: Normal heart sounds. No murmur heard.  Pulmonary:      Effort: Pulmonary effort is normal. No respiratory distress.      Breath sounds: Normal breath sounds. No wheezing or rales.   Musculoskeletal:      Right lower leg: No edema.      Left lower leg: No edema.   Skin:     General: Skin is warm and dry.   Neurological:      Mental Status: She is alert. Mental status is at baseline.      Gait: Gait normal.   Psychiatric:         Mood and Affect: Mood normal.         Behavior: Behavior normal.         Thought Content: Thought content normal.         Judgment: Judgment normal.           Assessment:       1. Recurrent UTI    2. Type 2 diabetes mellitus without complication, without long-term current use of insulin    3. Low serum vitamin D    4. B12 deficiency    5. Encounter for lipid screening for cardiovascular disease    6. Vitamin D deficiency    7. Neuralgia, postherpetic        Plan:       1. Recurrent UTI  Overview:  Previous cultures  2020- E coli resistant to ampicillin, cipro, macrobid, bactrim.  Sensitive to augmentin, cefepime,       Orders:  -     nitrofurantoin (MACRODANTIN) 50 MG capsule; Take one by mouth every other day for recurrent uti  Dispense: 45 capsule; Refill: 1    2. Type 2 diabetes mellitus without complication, without long-term current use of insulin  Assessment & Plan:  Stable. Continue current medications and regular followup.      Orders:  -     CBC Without Differential; Future; Expected date: 09/18/2024  -     Comprehensive Metabolic Panel; Future; Expected date: 09/18/2024  -     Microalbumin/Creatinine Ratio, Urine; Future; Expected date: 09/18/2024  -     Hemoglobin A1C; Future; Expected date: 09/18/2024  -     TSH;  Future; Expected date: 09/18/2024    3. Low serum vitamin D  -     Vitamin D; Future; Expected date: 09/18/2024    4. B12 deficiency  -     Vitamin B12; Future; Expected date: 09/18/2024    5. Encounter for lipid screening for cardiovascular disease  -     Lipid Panel; Future; Expected date: 09/18/2024    6. Vitamin D deficiency  -     Vitamin D; Future; Expected date: 09/18/2024    7. Neuralgia, postherpetic  Assessment & Plan:  Stable on gabapentin and aspercream prn

## 2024-09-19 ENCOUNTER — LAB VISIT (OUTPATIENT)
Dept: LAB | Facility: HOSPITAL | Age: 84
End: 2024-09-19
Attending: STUDENT IN AN ORGANIZED HEALTH CARE EDUCATION/TRAINING PROGRAM
Payer: MEDICARE

## 2024-09-19 DIAGNOSIS — E11.9 TYPE 2 DIABETES MELLITUS WITHOUT COMPLICATION, WITHOUT LONG-TERM CURRENT USE OF INSULIN: ICD-10-CM

## 2024-09-19 DIAGNOSIS — Z13.220 ENCOUNTER FOR LIPID SCREENING FOR CARDIOVASCULAR DISEASE: ICD-10-CM

## 2024-09-19 DIAGNOSIS — Z13.6 ENCOUNTER FOR LIPID SCREENING FOR CARDIOVASCULAR DISEASE: ICD-10-CM

## 2024-09-19 DIAGNOSIS — R79.89 LOW SERUM VITAMIN D: ICD-10-CM

## 2024-09-19 DIAGNOSIS — E53.8 B12 DEFICIENCY: ICD-10-CM

## 2024-09-19 DIAGNOSIS — E55.9 VITAMIN D DEFICIENCY: ICD-10-CM

## 2024-09-19 LAB
25(OH)D3+25(OH)D2 SERPL-MCNC: 114 NG/ML (ref 30–96)
ALBUMIN SERPL BCP-MCNC: 3.3 G/DL (ref 3.5–5.2)
ALP SERPL-CCNC: 70 U/L (ref 55–135)
ALT SERPL W/O P-5'-P-CCNC: 7 U/L (ref 10–44)
ANION GAP SERPL CALC-SCNC: 8 MMOL/L (ref 8–16)
AST SERPL-CCNC: 15 U/L (ref 10–40)
BILIRUB SERPL-MCNC: 0.4 MG/DL (ref 0.1–1)
BUN SERPL-MCNC: 13 MG/DL (ref 8–23)
CALCIUM SERPL-MCNC: 9.7 MG/DL (ref 8.7–10.5)
CHLORIDE SERPL-SCNC: 106 MMOL/L (ref 95–110)
CHOLEST SERPL-MCNC: 171 MG/DL (ref 120–199)
CHOLEST/HDLC SERPL: 2.1 {RATIO} (ref 2–5)
CO2 SERPL-SCNC: 26 MMOL/L (ref 23–29)
CREAT SERPL-MCNC: 0.9 MG/DL (ref 0.5–1.4)
ERYTHROCYTE [DISTWIDTH] IN BLOOD BY AUTOMATED COUNT: 15.4 % (ref 11.5–14.5)
EST. GFR  (NO RACE VARIABLE): >60 ML/MIN/1.73 M^2
ESTIMATED AVG GLUCOSE: 111 MG/DL (ref 68–131)
GLUCOSE SERPL-MCNC: 120 MG/DL (ref 70–110)
HBA1C MFR BLD: 5.5 % (ref 4–5.6)
HCT VFR BLD AUTO: 36.2 % (ref 37–48.5)
HDLC SERPL-MCNC: 81 MG/DL (ref 40–75)
HDLC SERPL: 47.4 % (ref 20–50)
HGB BLD-MCNC: 11.6 G/DL (ref 12–16)
LDLC SERPL CALC-MCNC: 80.2 MG/DL (ref 63–159)
MCH RBC QN AUTO: 28.3 PG (ref 27–31)
MCHC RBC AUTO-ENTMCNC: 32 G/DL (ref 32–36)
MCV RBC AUTO: 88 FL (ref 82–98)
NONHDLC SERPL-MCNC: 90 MG/DL
PLATELET # BLD AUTO: 222 K/UL (ref 150–450)
PMV BLD AUTO: 10.1 FL (ref 9.2–12.9)
POTASSIUM SERPL-SCNC: 4.3 MMOL/L (ref 3.5–5.1)
PROT SERPL-MCNC: 6.8 G/DL (ref 6–8.4)
RBC # BLD AUTO: 4.1 M/UL (ref 4–5.4)
SODIUM SERPL-SCNC: 140 MMOL/L (ref 136–145)
TRIGL SERPL-MCNC: 49 MG/DL (ref 30–150)
TSH SERPL DL<=0.005 MIU/L-ACNC: 1.81 UIU/ML (ref 0.4–4)
VIT B12 SERPL-MCNC: 380 PG/ML (ref 210–950)
WBC # BLD AUTO: 5.33 K/UL (ref 3.9–12.7)

## 2024-09-19 PROCEDURE — 82306 VITAMIN D 25 HYDROXY: CPT | Performed by: STUDENT IN AN ORGANIZED HEALTH CARE EDUCATION/TRAINING PROGRAM

## 2024-09-19 PROCEDURE — 82607 VITAMIN B-12: CPT | Performed by: STUDENT IN AN ORGANIZED HEALTH CARE EDUCATION/TRAINING PROGRAM

## 2024-09-19 PROCEDURE — 80061 LIPID PANEL: CPT | Performed by: STUDENT IN AN ORGANIZED HEALTH CARE EDUCATION/TRAINING PROGRAM

## 2024-09-19 PROCEDURE — 80053 COMPREHEN METABOLIC PANEL: CPT | Performed by: STUDENT IN AN ORGANIZED HEALTH CARE EDUCATION/TRAINING PROGRAM

## 2024-09-19 PROCEDURE — 85027 COMPLETE CBC AUTOMATED: CPT | Performed by: STUDENT IN AN ORGANIZED HEALTH CARE EDUCATION/TRAINING PROGRAM

## 2024-09-19 PROCEDURE — 83036 HEMOGLOBIN GLYCOSYLATED A1C: CPT | Performed by: STUDENT IN AN ORGANIZED HEALTH CARE EDUCATION/TRAINING PROGRAM

## 2024-09-19 PROCEDURE — 84443 ASSAY THYROID STIM HORMONE: CPT | Performed by: STUDENT IN AN ORGANIZED HEALTH CARE EDUCATION/TRAINING PROGRAM

## 2024-09-23 DIAGNOSIS — I49.3 FREQUENT PVCS: ICD-10-CM

## 2024-09-23 DIAGNOSIS — E11.9 TYPE 2 DIABETES MELLITUS WITHOUT COMPLICATION, WITHOUT LONG-TERM CURRENT USE OF INSULIN: Primary | ICD-10-CM

## 2024-09-23 NOTE — TELEPHONE ENCOUNTER
Refill Routing Note   Medication(s) are not appropriate for processing by [facility] for the following reason(s):        No active prescription written by provider    [facility] action(s):  Defer               Appointments  past 12m or future 3m with PCP    Date Provider   Last Visit   9/18/2024 Shu Smith MD   Next Visit   12/18/2024 Shu Smith MD   ED visits in past 90 days: 0        Note composed:12:36 PM 09/23/2024

## 2024-09-23 NOTE — TELEPHONE ENCOUNTER
Spoke with patient. She requested a refill when I called her to review her recent labs with Dr. Smith. I called the patient back to clarify that Dr. Smith would have to be the one to call the medication in and she has 72 business hours to review medications. Verbalized understanding.

## 2024-09-23 NOTE — TELEPHONE ENCOUNTER
----- Message from Susie Morris sent at 9/23/2024 12:17 PM CDT -----  Pt came in office states that you called her about her lab and that dr hathaway was calling in some meds when she got to the pharmacy it was not what ya'll has talk about. Please call pt [phone]

## 2024-09-23 NOTE — TELEPHONE ENCOUNTER
No care due was identified.  [facility] Embedded Care Due Messages. Reference number: [id number].   9/23/2024 8:44:53 AM CDT

## 2024-09-24 ENCOUNTER — TELEPHONE (OUTPATIENT)
Dept: FAMILY MEDICINE | Facility: CLINIC | Age: 84
End: 2024-09-24
Payer: MEDICARE

## 2024-09-24 RX ORDER — ALOGLIPTIN 25 MG/1
1 TABLET, FILM COATED ORAL DAILY
Qty: 90 TABLET | Refills: 3 | Status: SHIPPED | OUTPATIENT
Start: 2024-09-23

## 2024-09-24 RX ORDER — METOPROLOL SUCCINATE 50 MG/1
50 TABLET, EXTENDED RELEASE ORAL DAILY
Qty: 90 TABLET | Refills: 3 | Status: SHIPPED | OUTPATIENT
Start: 2024-09-23

## 2024-09-24 NOTE — TELEPHONE ENCOUNTER
----- Message from Mya Oliveira sent at 9/24/2024  9:44 AM CDT -----  Contact: pt [phone]  Type: Needs Medical Advice  Who Called:  Pt     Best Call Back Number: [phone]    Additional Information: Pt stated she was advised to call back today and speak w/ Catarina about an rx.

## 2024-10-04 DIAGNOSIS — E11.9 TYPE 2 DIABETES MELLITUS WITHOUT COMPLICATION, WITHOUT LONG-TERM CURRENT USE OF INSULIN: ICD-10-CM

## 2024-10-04 NOTE — TELEPHONE ENCOUNTER
Refill Routing Note   Medication(s) are not appropriate for processing by [facility] for the following reason(s):      New or recently adjusted medication    [facility] action(s):  Defer Care Due:  None identified     Medication Therapy Plan: change in pharmcy      Appointments  past 12m or future 3m with PCP    Date Provider   Last Visit   9/18/2024 Shu Smith MD   Next Visit   12/18/2024 Shu Smith MD   ED visits in past 90 days: 0        Note composed:5:40 PM 10/04/2024           information could not be obtained

## 2024-10-04 NOTE — TELEPHONE ENCOUNTER
----- Message from Karla sent at 10/4/2024 10:34 AM CDT -----  Type:  RX Refill Request    Who Called:  pt  Refill or New Rx:  REFILL  RX Name and Strength:  alogliptin (NESINA) 25 mg Tab  How is the patient currently taking it? (ex. 1XDay):  As directed  Is this a 30 day or 90 day RX:  30  Preferred Pharmacy with phone number:        Pharmacy Contact    Telephone Fax  [phone]    Pharmacy Address and Hours    Address Hours  [address] [place] [address] [place] [address]             Local or Mail Order: local    Ordering Provider:  rivas Ni Call Back Number:  [phone]  Additional Information:  pt only received 14 tablets, please send a rx to pharmacy above so pt can have her medication  Please call back to advise. Thanks.

## 2024-10-04 NOTE — TELEPHONE ENCOUNTER
No care due was identified.  [facility] Embedded Care Due Messages. Reference number: [id number].   10/04/2024 10:38:35 AM CDT

## 2024-10-07 ENCOUNTER — TELEPHONE (OUTPATIENT)
Dept: FAMILY MEDICINE | Facility: CLINIC | Age: 84
End: 2024-10-07
Payer: MEDICARE

## 2024-10-07 RX ORDER — ALOGLIPTIN 25 MG/1
1 TABLET, FILM COATED ORAL DAILY
Qty: 90 TABLET | Refills: 0 | Status: SHIPPED | OUTPATIENT
Start: 2024-10-07

## 2024-10-07 NOTE — TELEPHONE ENCOUNTER
----- Message from Valerie sent at 10/7/2024  8:46 AM CDT -----  Type:  RX Refill Request    Who Called:  pt  Refill or New Rx:  refill  RX Name and Strength:  alogliptin (NESINA) 25 mg Tab  How is the patient currently taking it? (ex. 1XDay):  as directed  Is this a 30 day or 90 day RX:  90  Preferred Pharmacy with phone number:    "[id number] [place] [id number] AT [facility] [place] [address]  [address] [place] [address]  Phone: [phone] Fax: [phone]  Call back number: [phone]  Additional Information:  pt is calling in regards to needing to get this Rx filled at the above listed pharmacy. Pt says that she called Friday and has not heard anything back. Please call pt and let her know if and when the Rx will be sent. Please call back and advise. Thanks!

## 2024-10-23 NOTE — TELEPHONE ENCOUNTER
No care due was identified.  [facility] Embedded Care Due Messages. Reference number: [id number].   10/23/2024 2:29:56 PM CDT

## 2024-10-23 NOTE — TELEPHONE ENCOUNTER
----- Message from Malgorzata Herring sent at 10/23/2024  2:15 PM CDT -----  Regarding: medication prescription  PT needs her medication refilled and sent to [facility] in [place]. Prescription needed: Rosuvastatin 5MG Tablets. Pt can be reached at [phone]

## 2024-10-24 RX ORDER — ROSUVASTATIN CALCIUM 5 MG/1
5 TABLET, COATED ORAL EVERY OTHER DAY
Qty: 90 TABLET | Refills: 3 | Status: SHIPPED | OUTPATIENT
Start: 2024-10-24

## 2024-10-24 NOTE — TELEPHONE ENCOUNTER
Refill Routing Note   Medication(s) are not appropriate for processing by [facility] for the following reason(s):        No active prescription written by provider    [facility] action(s):  Defer               Appointments  past 12m or future 3m with PCP    Date Provider   Last Visit   9/18/2024 Shu Smith MD   Next Visit   12/18/2024 Shu Smith MD   ED visits in past 90 days: 0        Note composed:9:47 PM 10/23/2024

## 2024-11-05 DIAGNOSIS — E11.9 TYPE 2 DIABETES MELLITUS WITHOUT COMPLICATION, WITHOUT LONG-TERM CURRENT USE OF INSULIN: ICD-10-CM

## 2024-11-05 RX ORDER — ALOGLIPTIN 25 MG/1
1 TABLET, FILM COATED ORAL DAILY
Qty: 90 TABLET | Refills: 0 | Status: SHIPPED | OUTPATIENT
Start: 2024-11-05

## 2024-11-05 NOTE — TELEPHONE ENCOUNTER
No care due was identified.  [facility] Embedded Care Due Messages. Reference number: [id number].   11/05/2024 12:38:49 PM CST

## 2024-11-05 NOTE — TELEPHONE ENCOUNTER
----- Message from Malgorzata Herring sent at 11/5/2024 12:29 PM CST -----  Regarding: refill  Patient said medication was [facility] ; Patient no longer uses [facility]. Please resend prescription to [facility] in [place]. Alogliptin 25mg. PT can be reached at [phone]

## 2024-12-18 ENCOUNTER — OFFICE VISIT (OUTPATIENT)
Dept: FAMILY MEDICINE | Facility: CLINIC | Age: 84
End: 2024-12-18
Payer: MEDICARE

## 2024-12-18 VITALS
HEART RATE: 66 BPM | DIASTOLIC BLOOD PRESSURE: 75 MMHG | BODY MASS INDEX: 19.49 KG/M2 | RESPIRATION RATE: 16 BRPM | WEIGHT: 117 LBS | HEIGHT: 65 IN | SYSTOLIC BLOOD PRESSURE: 124 MMHG | OXYGEN SATURATION: 99 %

## 2024-12-18 DIAGNOSIS — K21.9 GASTROESOPHAGEAL REFLUX DISEASE WITHOUT ESOPHAGITIS: ICD-10-CM

## 2024-12-18 DIAGNOSIS — R13.10 DYSPHAGIA, UNSPECIFIED TYPE: ICD-10-CM

## 2024-12-18 DIAGNOSIS — E11.9 TYPE 2 DIABETES MELLITUS WITHOUT COMPLICATION, WITHOUT LONG-TERM CURRENT USE OF INSULIN: Primary | ICD-10-CM

## 2024-12-18 DIAGNOSIS — B02.29 NEURALGIA, POSTHERPETIC: ICD-10-CM

## 2024-12-18 PROCEDURE — 99213 OFFICE O/P EST LOW 20 MIN: CPT | Mod: PBBFAC,PN | Performed by: STUDENT IN AN ORGANIZED HEALTH CARE EDUCATION/TRAINING PROGRAM

## 2024-12-18 PROCEDURE — 99214 OFFICE O/P EST MOD 30 MIN: CPT | Mod: S$PBB,,, | Performed by: STUDENT IN AN ORGANIZED HEALTH CARE EDUCATION/TRAINING PROGRAM

## 2024-12-18 PROCEDURE — 99999 PR PBB SHADOW E&M-EST. PATIENT-LVL III: CPT | Mod: PBBFAC,,, | Performed by: STUDENT IN AN ORGANIZED HEALTH CARE EDUCATION/TRAINING PROGRAM

## 2024-12-18 RX ORDER — FAMOTIDINE 20 MG/1
20 TABLET, FILM COATED ORAL 2 TIMES DAILY
Qty: 180 TABLET | Refills: 3 | Status: SHIPPED | OUTPATIENT
Start: 2024-12-18

## 2024-12-18 RX ORDER — ALOGLIPTIN 25 MG/1
1 TABLET, FILM COATED ORAL DAILY
Qty: 90 TABLET | Refills: 3 | Status: SHIPPED | OUTPATIENT
Start: 2024-12-18 | End: 2024-12-18 | Stop reason: SDUPTHER

## 2024-12-18 RX ORDER — ALOGLIPTIN 25 MG/1
1 TABLET, FILM COATED ORAL DAILY
Qty: 90 TABLET | Refills: 3 | Status: SHIPPED | OUTPATIENT
Start: 2024-12-18

## 2024-12-18 NOTE — ASSESSMENT & PLAN NOTE
Uses dr. Vallecillo for GI procedures and is s/p esophageal dilation  Doing well. No big issues now

## 2024-12-18 NOTE — PROGRESS NOTES
Subjective:       Patient ID: Stacie Hudson is a 84 y.o. female.    Chief Complaint: Follow-up    History of Present Illness    CHIEF COMPLAINT:  Ms. Hudson presents today for follow-up on shingles and diabetes management.    SHINGLES:  She reports improvement in shingles pain following resolution of lesions. She continues gabapentin 100 mg. She expresses interest in receiving shingles vaccination.    DIABETES:  Blood sugar was 110 this morning. HbA1c is 5.5. She continues rosuvastatin and alogliptin for management.    ESOPHAGEAL DISORDERS:  She has a history of three esophageal dilation procedures for swallowing difficulties. She acknowledges ongoing swallowing issues and anticipates needing repeat dilation in the future.    OCULAR:  She has macular degeneration requiring regular injections - one type for wet macular degeneration and another for dry macular degeneration. The injection frequency has been gradually extended from shorter intervals to current 5-week intervals between treatments.    IMMUNIZATIONS:  She expresses hesitancy about receiving influenza vaccination.       Review of Systems   Constitutional:  Negative for activity change and appetite change.   Respiratory:  Negative for shortness of breath.    Cardiovascular:  Negative for chest pain.   Gastrointestinal:  Negative for abdominal pain.   Genitourinary:  Negative for dysuria.   Integumentary:  Negative for rash.   Psychiatric/Behavioral:  Negative for depressed mood and sleep disturbance. The patient is not nervous/anxious.       Patient Active Problem List   Diagnosis    Recurrent UTI    Dysphagia    Type 2 diabetes mellitus without complication, without long-term current use of insulin    Low serum vitamin D    B12 deficiency    Macular degeneration    Frequent PVCs    Gastroesophageal reflux disease without esophagitis    Neuralgia, postherpetic     Stacie has a current medication list which includes the following prescription(s): gabapentin,  metoprolol succinate, nitrofurantoin, rosuvastatin, vit c/e/zn/coppr/lutein/zeaxan, alogliptin, famotidine, and lidocaine hcl 2%.        Health Maintenance Due   Topic Date Due    Diabetes Urine Screening  Never done    Pneumococcal Vaccines (Age 65+) (1 of 2 - PCV) Never done    Eye Exam  Never done    TETANUS VACCINE  Never done    DEXA Scan  Never done    Shingles Vaccine (1 of 2) Never done    RSV Vaccine (Age 60+ and Pregnant patients) (1 - 1-dose 75+ series) Never done    COVID-19 Vaccine (4 - 2024-25 season) 09/01/2024      Health Maintenance reviewed and discussed- request eye exam, schedule dexa. Refused flu vaccine.     Objective:      Physical Exam  Constitutional:       General: She is not in acute distress.     Appearance: Normal appearance. She is not ill-appearing.   Eyes:      Conjunctiva/sclera: Conjunctivae normal.   Cardiovascular:      Rate and Rhythm: Normal rate and regular rhythm.      Heart sounds: Normal heart sounds. No murmur heard.  Pulmonary:      Effort: Pulmonary effort is normal. No respiratory distress.      Breath sounds: Normal breath sounds. No wheezing or rales.   Musculoskeletal:      Right lower leg: No edema.      Left lower leg: No edema.   Lymphadenopathy:      Cervical: No cervical adenopathy.   Skin:     General: Skin is warm and dry.   Neurological:      Mental Status: She is alert. Mental status is at baseline.      Gait: Gait normal.   Psychiatric:         Mood and Affect: Mood normal.         Behavior: Behavior normal.         Thought Content: Thought content normal.         Judgment: Judgment normal.         Assessment:       Assessment & Plan    1. Assessed shingles: resolved with no visible lesions remaining  2. Evaluated diabetes management: hemoglobin A1c at 5.5, indicating excellent glycemic control  3. Reviewed swallowing issues: patient stable, aware of need for periodic procedures to address recurrent narrowing  4. Considered pain management post-shingles:  pain has subsided with gabapentin treatment  5. Noted ongoing management of macular degeneration by retina specialist, including regular eye injections  6. Performed respiratory and cardiac exam: no abnormalities detected           Plan:       1. Type 2 diabetes mellitus without complication, without long-term current use of insulin  Assessment & Plan:  Stable. Continue current medications and regular followup.  Lab Results   Component Value Date    HGBA1C 5.5 09/19/2024     Diabetes Medications               alogliptin (NESINA) 25 mg Tab Take 1 tablet by mouth once daily.              Orders:  -     Microalbumin/Creatinine Ratio, Urine; Future; Expected date: 12/18/2024  -     Hemoglobin A1C; Future; Expected date: 12/18/2024  -     Discontinue: alogliptin (NESINA) 25 mg Tab; Take 1 tablet by mouth once daily.  Dispense: 90 tablet; Refill: 3  -     alogliptin (NESINA) 25 mg Tab; Take 1 tablet by mouth once daily.  Dispense: 90 tablet; Refill: 3    2. Dysphagia, unspecified type  Assessment & Plan:  Uses dr. Vallecillo for GI procedures and is s/p esophageal dilation  Doing well. No big issues now      3. Neuralgia, postherpetic  Comments:  rash is better, pain is better.    4. Gastroesophageal reflux disease without esophagitis  -     famotidine (PEPCID) 20 MG tablet; Take 1 tablet (20 mg total) by mouth 2 (two) times daily.  Dispense: 180 tablet; Refill: 3         This note was generated with the assistance of ambient listening technology. Verbal consent was obtained by the patient and accompanying visitor(s) for the recording of patient appointment to facilitate this note. I attest to having reviewed and edited the generated note for accuracy, though some syntax or spelling errors may persist. Please contact the author of this note for any clarification.

## 2024-12-18 NOTE — ASSESSMENT & PLAN NOTE
Stable. Continue current medications and regular followup.  Lab Results   Component Value Date    HGBA1C 5.5 09/19/2024     Diabetes Medications               alogliptin (NESINA) 25 mg Tab Take 1 tablet by mouth once daily.

## 2024-12-19 ENCOUNTER — LAB VISIT (OUTPATIENT)
Dept: LAB | Facility: HOSPITAL | Age: 84
End: 2024-12-19
Attending: STUDENT IN AN ORGANIZED HEALTH CARE EDUCATION/TRAINING PROGRAM
Payer: MEDICARE

## 2024-12-19 ENCOUNTER — PATIENT OUTREACH (OUTPATIENT)
Dept: ADMINISTRATIVE | Facility: HOSPITAL | Age: 84
End: 2024-12-19
Payer: MEDICARE

## 2024-12-19 DIAGNOSIS — E11.9 TYPE 2 DIABETES MELLITUS WITHOUT COMPLICATION, WITHOUT LONG-TERM CURRENT USE OF INSULIN: ICD-10-CM

## 2024-12-19 LAB
ALBUMIN/CREAT UR: 11.5 UG/MG (ref 0–30)
CREAT UR-MCNC: 52 MG/DL (ref 15–325)
ESTIMATED AVG GLUCOSE: 114 MG/DL (ref 68–131)
HBA1C MFR BLD: 5.6 % (ref 4–5.6)
MICROALBUMIN UR DL<=1MG/L-MCNC: 6 UG/ML

## 2024-12-19 PROCEDURE — 83036 HEMOGLOBIN GLYCOSYLATED A1C: CPT | Performed by: STUDENT IN AN ORGANIZED HEALTH CARE EDUCATION/TRAINING PROGRAM

## 2024-12-19 PROCEDURE — 82570 ASSAY OF URINE CREATININE: CPT | Performed by: STUDENT IN AN ORGANIZED HEALTH CARE EDUCATION/TRAINING PROGRAM

## 2024-12-19 PROCEDURE — 36415 COLL VENOUS BLD VENIPUNCTURE: CPT | Performed by: STUDENT IN AN ORGANIZED HEALTH CARE EDUCATION/TRAINING PROGRAM

## 2024-12-19 NOTE — PROGRESS NOTES
Population Health Chart Review & Patient Outreach Details      Additional Pop Health Notes:               Updates Requested / Reviewed:      Updated Care Coordination Note and Care Team Updated         Health Maintenance Topics Overdue:      VBHM Score: 2     Osteoporosis Screening  Eye Exam    Pneumonia Vaccine  Tetanus Vaccine  Shingles/Zoster Vaccine  RSV Vaccine                  Health Maintenance Topic(s) Outreach Outcomes & Actions Taken:    Eye Exam - Outreach Outcomes & Actions Taken  : External Records Requested & Care Team Updated if Applicable      
139.9

## 2024-12-19 NOTE — LETTER
AUTHORIZATION FOR RELEASE OF   CONFIDENTIAL INFORMATION    Dear Dr Torres,    We are seeing Stacie Hudson, date of birth 1940, in the clinic at [facility]. Shu Smith MD is the patient's PCP. Stacie Hudson has an outstanding lab/procedure at the time we reviewed her chart. In order to help keep her health information updated, she has authorized us to request the following medical record(s):        (  )  MAMMOGRAM                                      (  )  COLONOSCOPY      (  )  PAP SMEAR                                          (  )  OUTSIDE LAB RESULTS     (  )  DEXA SCAN                                          ( X )  EYE EXAM            (  )  FOOT EXAM                                          (  )  ENTIRE RECORD     (  )  OUTSIDE IMMUNIZATIONS                 (  )  _______________         Please fax records to [facility], Theriot, Christie H., MD at [phone]    Thanks so much and have a great day!    Morenita Jeff LPN [facility]  [address] [place] [address]  P- [phone]  F [phone]           Patient Name: Stacie Hudson  : 1940  Patient Phone #: [phone]

## 2025-02-14 ENCOUNTER — OFFICE VISIT (OUTPATIENT)
Dept: FAMILY MEDICINE | Facility: CLINIC | Age: 85
End: 2025-02-14
Payer: MEDICARE

## 2025-02-14 VITALS
WEIGHT: 117.31 LBS | RESPIRATION RATE: 16 BRPM | SYSTOLIC BLOOD PRESSURE: 112 MMHG | HEIGHT: 65 IN | BODY MASS INDEX: 19.54 KG/M2 | OXYGEN SATURATION: 99 % | DIASTOLIC BLOOD PRESSURE: 60 MMHG | HEART RATE: 66 BPM

## 2025-02-14 DIAGNOSIS — H61.23 IMPACTED CERUMEN OF BOTH EARS: Primary | ICD-10-CM

## 2025-02-14 DIAGNOSIS — E11.9 TYPE 2 DIABETES MELLITUS WITHOUT COMPLICATION, WITHOUT LONG-TERM CURRENT USE OF INSULIN: ICD-10-CM

## 2025-02-14 DIAGNOSIS — I70.0 AORTIC ATHEROSCLEROSIS: ICD-10-CM

## 2025-02-14 PROCEDURE — 99213 OFFICE O/P EST LOW 20 MIN: CPT | Mod: PBBFAC,PN

## 2025-02-14 PROCEDURE — 99999 PR PBB SHADOW E&M-EST. PATIENT-LVL III: CPT | Mod: PBBFAC,,,

## 2025-02-14 NOTE — ASSESSMENT & PLAN NOTE
Bilaterally ear irrigation completed with success of removal of cerumen. Patient tolerated well. Immediately improvement noted.

## 2025-02-14 NOTE — ASSESSMENT & PLAN NOTE
Stable.   Continue current medication regimen  Diabetes Medications               alogliptin (NESINA) 25 mg Tab Take 1 tablet by mouth once daily.           Lab Results   Component Value Date    HGBA1C 5.6 12/19/2024

## 2025-02-14 NOTE — ASSESSMENT & PLAN NOTE
Stable.   No concerns at this time.   Continue current medication regimen    Hyperlipidemia Medications               rosuvastatin (CRESTOR) 5 MG tablet Take 1 tablet (5 mg total) by mouth every other day.           Hypertension Medications               metoprolol succinate (TOPROL-XL) 50 MG 24 hr tablet Take 1 tablet (50 mg total) by mouth once daily.

## 2025-02-14 NOTE — PROGRESS NOTES
Subjective:        Chief Complaint: Ear Fullness (Patient has a blocked right ear that has been going on for about 4 days. )    Stacie Hudson is a 84 y.o. female, presents to clinic For right ear pain x4 days. She states history of wax build up and its been approx 5 years since last time she had wax removed. She does state decrease hearing to right ear. She denies fever, or trauma to ear. Denies headaches     Ear Fullness         Patient Active Problem List   Diagnosis    Recurrent UTI    Dysphagia    Type 2 diabetes mellitus without complication, without long-term current use of insulin    Low serum vitamin D    B12 deficiency    Macular degeneration    Frequent PVCs    Gastroesophageal reflux disease without esophagitis    Neuralgia, postherpetic    Aortic atherosclerosis    Impacted cerumen of both ears     Stacie has a current medication list which includes the following prescription(s): alogliptin, famotidine, gabapentin, metoprolol succinate, nitrofurantoin, rosuvastatin, vit c/e/zn/coppr/lutein/zeaxan, and lidocaine hcl 2%.    Review of Systems   Constitutional: Negative.    HENT:  Positive for ear pain (right side).    Eyes: Negative.    Respiratory: Negative.     Cardiovascular: Negative.    Gastrointestinal: Negative.    Endocrine: Negative.    Genitourinary: Negative.    Musculoskeletal: Negative.    Integumentary:  Negative.   Allergic/Immunologic: Negative.    Neurological: Negative.    Psychiatric/Behavioral: Negative.           Health Maintenance Due   Topic Date Due    Diabetic Eye Exam  Never done    TETANUS VACCINE  Never done    Pneumococcal Vaccines (Age 50+) (1 of 2 - PCV) Never done    DEXA Scan  Never done    Shingles Vaccine (1 of 2) Never done    RSV Vaccine (Age 60+ and Pregnant patients) (1 - 1-dose 75+ series) Never done    COVID-19 Vaccine (4 - 2024-25 season) 09/01/2024      Health Maintenance reviewed and discussed- declined at this time   Objective:      Physical Exam  Vitals  reviewed.   Constitutional:       Appearance: Normal appearance. She is normal weight.   HENT:      Head: Normocephalic.      Right Ear: There is impacted cerumen.      Left Ear: There is impacted cerumen.      Nose: Nose normal.   Eyes:      Extraocular Movements: Extraocular movements intact.   Cardiovascular:      Rate and Rhythm: Normal rate and regular rhythm.      Pulses: Normal pulses.      Heart sounds: Normal heart sounds.   Pulmonary:      Effort: Pulmonary effort is normal.      Breath sounds: Normal breath sounds.   Abdominal:      General: Abdomen is flat.      Palpations: Abdomen is soft.   Musculoskeletal:         General: Normal range of motion.      Cervical back: Normal range of motion.   Skin:     General: Skin is warm and dry.   Neurological:      General: No focal deficit present.      Mental Status: She is alert.   Psychiatric:         Mood and Affect: Mood normal.         Behavior: Behavior normal.         Thought Content: Thought content normal.         Judgment: Judgment normal.         Assessment:       1. Impacted cerumen of both ears    2. Aortic atherosclerosis    3. Type 2 diabetes mellitus without complication, without long-term current use of insulin        Plan:       1. Impacted cerumen of both ears  Assessment & Plan:  Bilaterally ear irrigation completed with success of removal of cerumen. Patient tolerated well. Immediately improvement noted.       2. Aortic atherosclerosis  Assessment & Plan:  Stable.   No concerns at this time.   Continue current medication regimen    Hyperlipidemia Medications               rosuvastatin (CRESTOR) 5 MG tablet Take 1 tablet (5 mg total) by mouth every other day.           Hypertension Medications               metoprolol succinate (TOPROL-XL) 50 MG 24 hr tablet Take 1 tablet (50 mg total) by mouth once daily.                 3. Type 2 diabetes mellitus without complication, without long-term current use of insulin  Assessment & Plan:  Stable.    Continue current medication regimen  Diabetes Medications               alogliptin (NESINA) 25 mg Tab Take 1 tablet by mouth once daily.           Lab Results   Component Value Date    HGBA1C 5.6 12/19/2024

## 2025-02-15 NOTE — PROGRESS NOTES
I have reviewed the notes, assessments, and/or procedures performed by Arabella Chan NP, I concur with her/his documentation of Stacie Hudson.

## 2025-02-18 ENCOUNTER — OFFICE VISIT (OUTPATIENT)
Dept: FAMILY MEDICINE | Facility: CLINIC | Age: 85
End: 2025-02-18
Payer: MEDICARE

## 2025-02-18 VITALS
HEIGHT: 65 IN | DIASTOLIC BLOOD PRESSURE: 64 MMHG | SYSTOLIC BLOOD PRESSURE: 102 MMHG | HEART RATE: 72 BPM | BODY MASS INDEX: 19.72 KG/M2 | WEIGHT: 118.38 LBS | OXYGEN SATURATION: 99 % | RESPIRATION RATE: 16 BRPM

## 2025-02-18 DIAGNOSIS — H91.90 HEARING LOSS, UNSPECIFIED HEARING LOSS TYPE, UNSPECIFIED LATERALITY: ICD-10-CM

## 2025-02-18 DIAGNOSIS — H93.13 RINGING IN EAR, BILATERAL: Primary | ICD-10-CM

## 2025-02-18 PROCEDURE — 99214 OFFICE O/P EST MOD 30 MIN: CPT | Mod: PBBFAC,PN

## 2025-02-18 NOTE — ASSESSMENT & PLAN NOTE
"States feels like a "muffled sound" more right than left ear   No obvious drainage noted bilaterally ear canal, dried blood noted bilaterally  Referral sent to ENT to follow up and assess.   "

## 2025-02-18 NOTE — PROGRESS NOTES
Subjective:        Chief Complaint: Tinnitus (Ringing in ears and still feels blocked)    Stacie Hudson is a 84 y.o. female, presents to clinic For follow up of ear concerns. She was seen on Friday Febuarry 14, 2024 and present with right ear pain/pressure. On exam she had bilaterally cerumen impaction. Ear irrigation was completed and she states relief immediately. She states her hearing was better and pressure was resolved.   She presents today and states on Saturday she felt like the symptoms came back, and then on Sunday states she started having bilaterally ear pain.   She denies placing any foreign objects in ears.       Ringing in Ears:    Associated symptoms: Ear pain (bilaterally, more right side then left) and tinnitus (bilaterally, more right than left).        Problem List[1]  Stacie has a current medication list which includes the following prescription(s): alogliptin, famotidine, gabapentin, metoprolol succinate, nitrofurantoin, rosuvastatin, vit c/e/zn/coppr/lutein/zeaxan, and lidocaine hcl 2%.    Review of Systems   Constitutional: Negative.    HENT:  Positive for ear pain (bilaterally, more right side then left) and tinnitus (bilaterally, more right than left).    Eyes: Negative.    Respiratory: Negative.     Cardiovascular: Negative.    Gastrointestinal: Negative.    Endocrine: Negative.    Genitourinary: Negative.    Musculoskeletal: Negative.    Integumentary:  Negative.   Allergic/Immunologic: Negative.    Neurological: Negative.    Psychiatric/Behavioral: Negative.             Objective:      Physical Exam  Vitals reviewed.   Constitutional:       Appearance: Normal appearance. She is normal weight.   HENT:      Head: Normocephalic.      Ears:      Comments: On exam bilaterally ear canal are impacted with dry blood, no obvious bleeding noted. Unable to see if possible ruptured ear drum      Nose: Nose normal.   Eyes:      Extraocular Movements: Extraocular movements intact.   Cardiovascular:       "Rate and Rhythm: Normal rate and regular rhythm.      Pulses: Normal pulses.      Heart sounds: Normal heart sounds.   Pulmonary:      Effort: Pulmonary effort is normal.      Breath sounds: Normal breath sounds.   Abdominal:      General: Abdomen is flat.      Palpations: Abdomen is soft.   Musculoskeletal:         General: Normal range of motion.      Cervical back: Normal range of motion.   Skin:     General: Skin is warm and dry.   Neurological:      General: No focal deficit present.      Mental Status: She is alert.   Psychiatric:         Mood and Affect: Mood normal.         Behavior: Behavior normal.         Thought Content: Thought content normal.         Judgment: Judgment normal.         Assessment:       1. Ringing in ear, bilateral    2. Hearing loss, unspecified hearing loss type, unspecified laterality        Plan:       1. Ringing in ear, bilateral  Assessment & Plan:  States feels like a "muffled sound" more right than left ear   No obvious drainage noted bilaterally ear canal, dried blood noted bilaterally  Referral sent to ENT to follow up and assess.     Orders:  -     Ambulatory referral/consult to ENT; Future; Expected date: 02/25/2025    2. Hearing loss, unspecified hearing loss type, unspecified laterality  Assessment & Plan:  States feels like a "muffled sound" more right than left ear   No obvious drainage noted bilaterally ear canal, dried blood noted bilaterally  Referral sent to ENT to follow up and assess.     Orders:  -     Ambulatory referral/consult to ENT; Future; Expected date: 02/25/2025                       [1]   Patient Active Problem List  Diagnosis    Recurrent UTI    Dysphagia    Type 2 diabetes mellitus without complication, without long-term current use of insulin    Low serum vitamin D    B12 deficiency    Macular degeneration    Frequent PVCs    Gastroesophageal reflux disease without esophagitis    Neuralgia, postherpetic    Aortic atherosclerosis    Impacted cerumen of " both ears    Hearing loss    Ringing in ear, bilateral

## 2025-02-19 ENCOUNTER — OFFICE VISIT (OUTPATIENT)
Dept: OTOLARYNGOLOGY | Facility: CLINIC | Age: 85
End: 2025-02-19
Payer: MEDICARE

## 2025-02-19 VITALS — WEIGHT: 118.38 LBS | HEIGHT: 65 IN | BODY MASS INDEX: 19.72 KG/M2

## 2025-02-19 DIAGNOSIS — H91.90 HEARING LOSS, UNSPECIFIED HEARING LOSS TYPE, UNSPECIFIED LATERALITY: ICD-10-CM

## 2025-02-19 DIAGNOSIS — H93.13 RINGING IN EAR, BILATERAL: ICD-10-CM

## 2025-02-19 PROCEDURE — 99213 OFFICE O/P EST LOW 20 MIN: CPT | Mod: PBBFAC,PN | Performed by: OTOLARYNGOLOGY

## 2025-02-19 PROCEDURE — 99999 PR PBB SHADOW E&M-EST. PATIENT-LVL III: CPT | Mod: PBBFAC,,, | Performed by: OTOLARYNGOLOGY

## 2025-02-19 NOTE — PROGRESS NOTES
"Subjective:       Patient ID: Stacie Hudson is a 84 y.o. female.    Chief Complaint: Ear Problem (C/o "dull ringing in both ears, the right ear is more muffled." She stated, "I had some bleeding but I'm not sure if that's from the scraping when they cleaned my ears." )      This patient tells me that historically she has had ear wax buildup that needed cleaning every year or so and having moved to [place] around the time of COVID she has not had anything checked but more recently she has had some ringing especially in her right ear and some attempts by her primary care provider to clean the wax out have not completely remedy the situation.          Objective:      ENT Physical Exam    So this pleasant patient on the right side has a fairly small ear canal there has a good bit of deep deep wax that I irrigated out with saline because initial attempts to remove it with suctioned and instruments were not being successful.  Once that was cleared out and vacuum dry the eardrum look fine and she reported improvement in the way it felt.      On the left side there has a little flaky of blood in the inferior ear canal and a attempts to remove it were uncomfortable it was not blocking the canal or against the eardrum and so we aborted the attempts at that point in the interest of not hurting her or instigating fresh bleeding.  She really isn't much bothered by this left ear.        Assessment:       1. Ringing in ear, bilateral    2. Hearing loss, unspecified hearing loss type, unspecified laterality         Plan:          So she reports in general good hearing although as we communicate I suspect she has some common level of hearing loss in any case she is not bothered by it she mentions her  has fairly prominent hearing loss and does not wear his hearing aids and she is satisfied with having the wax removed especially on the right and now the ringing has a improved with cleaning.        "

## 2025-02-22 DIAGNOSIS — Z00.00 ENCOUNTER FOR MEDICARE ANNUAL WELLNESS EXAM: ICD-10-CM

## 2025-03-19 ENCOUNTER — OFFICE VISIT (OUTPATIENT)
Dept: FAMILY MEDICINE | Facility: CLINIC | Age: 85
End: 2025-03-19
Payer: MEDICARE

## 2025-03-19 VITALS
HEART RATE: 88 BPM | SYSTOLIC BLOOD PRESSURE: 110 MMHG | BODY MASS INDEX: 19.46 KG/M2 | OXYGEN SATURATION: 98 % | DIASTOLIC BLOOD PRESSURE: 75 MMHG | HEIGHT: 65 IN | WEIGHT: 116.81 LBS | RESPIRATION RATE: 16 BRPM

## 2025-03-19 DIAGNOSIS — R26.9 ABNORMAL GAIT: ICD-10-CM
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
## 2025-03-19 DIAGNOSIS — Z78.0 POSTMENOPAUSAL: ICD-10-CM

## 2025-03-19 DIAGNOSIS — K21.9 GASTROESOPHAGEAL REFLUX DISEASE WITHOUT ESOPHAGITIS: ICD-10-CM

## 2025-03-19 DIAGNOSIS — I49.3 FREQUENT PVCS: ICD-10-CM

## 2025-03-19 DIAGNOSIS — R13.10 DYSPHAGIA, UNSPECIFIED TYPE: ICD-10-CM

## 2025-03-19 DIAGNOSIS — N39.0 RECURRENT UTI: ICD-10-CM

## 2025-03-19 DIAGNOSIS — H61.23 IMPACTED CERUMEN OF BOTH EARS: ICD-10-CM

## 2025-03-19 DIAGNOSIS — E11.9 TYPE 2 DIABETES MELLITUS WITHOUT COMPLICATION, WITHOUT LONG-TERM CURRENT USE OF INSULIN: ICD-10-CM

## 2025-03-19 PROCEDURE — 99999 PR PBB SHADOW E&M-EST. PATIENT-LVL III: CPT | Mod: PBBFAC,,, | Performed by: STUDENT IN AN ORGANIZED HEALTH CARE EDUCATION/TRAINING PROGRAM

## 2025-03-19 PROCEDURE — 99213 OFFICE O/P EST LOW 20 MIN: CPT | Mod: PBBFAC,PN | Performed by: STUDENT IN AN ORGANIZED HEALTH CARE EDUCATION/TRAINING PROGRAM

## 2025-03-19 RX ORDER — FAMOTIDINE 20 MG/1
20 TABLET, FILM COATED ORAL 2 TIMES DAILY
Qty: 180 TABLET | Refills: 3 | Status: SHIPPED | OUTPATIENT
Start: 2025-03-19

## 2025-03-19 RX ORDER — ALOGLIPTIN 25 MG/1
1 TABLET, FILM COATED ORAL DAILY
Qty: 90 TABLET | Refills: 3 | Status: SHIPPED | OUTPATIENT
Start: 2025-03-19

## 2025-03-19 RX ORDER — ROSUVASTATIN CALCIUM 5 MG/1
5 TABLET, COATED ORAL EVERY OTHER DAY
Qty: 90 TABLET | Refills: 3 | Status: SHIPPED | OUTPATIENT
Start: 2025-03-19

## 2025-03-19 NOTE — PROGRESS NOTES
Subjective:       Patient ID: Stacie Hudson is a 84 y.o. female.    Chief Complaint: Fall    History of Present Illness    CHIEF COMPLAINT:  Ms. Hudson presents today following a fall at [facility] [employer]    INJURY FROM RECENT FALL:  She tripped over a raised yellow strip on the road at a [employer] dealership, resulting in hand injuries with significant bruising on one hand. She has been applying cold compresses and taking Advil with minimal pain relief. She denies history of frequent falls and has never required assistive devices for ambulation.    OPHTHALMOLOGY:  She receives regular care from retina specialist Dr. Torres near [place], including bilateral eye injections every 5-6 weeks. She experiences burning and stinging sensation during the injections. Her appointments include dilation and glaucoma screening.    ENT:  She recently saw Dr. Mercado who removed significant material from ears, resulting in mild improvement in hearing.       Review of Systems   Constitutional:  Negative for activity change and appetite change.   Respiratory:  Negative for shortness of breath.    Cardiovascular:  Negative for chest pain.   Gastrointestinal:  Negative for abdominal pain.   Genitourinary:  Negative for dysuria.   Musculoskeletal:  Positive for gait problem.   Integumentary:  Negative for rash.   Psychiatric/Behavioral:  Negative for depressed mood and sleep disturbance. The patient is not nervous/anxious.       Problem List[1]  Stacie has a current medication list which includes the following prescription(s): gabapentin, lidocaine hcl 2%, metoprolol succinate, nitrofurantoin, vit c/e/zn/coppr/lutein/zeaxan, alogliptin, famotidine, and rosuvastatin.        Health Maintenance Due   Topic Date Due    Diabetic Eye Exam  Never done    TETANUS VACCINE  Never done    Pneumococcal Vaccines (Age 50+) (1 of 2 - PCV) Never done    DEXA Scan  Never done    Shingles Vaccine (1 of 2) Never done    RSV Vaccine (Age 60+ and Pregnant  patients) (1 - 1-dose 75+ series) Never done    COVID-19 Vaccine (4 - 2024-25 season) 09/01/2024      Health Maintenance reviewed and discussed- seeing her retina specialist next week..     Objective:      Physical Exam  Constitutional:       General: She is not in acute distress.     Appearance: Normal appearance. She is not ill-appearing.   Eyes:      Conjunctiva/sclera: Conjunctivae normal.   Cardiovascular:      Rate and Rhythm: Normal rate and regular rhythm.      Heart sounds: Normal heart sounds. No murmur heard.  Pulmonary:      Effort: Pulmonary effort is normal. No respiratory distress.      Breath sounds: Normal breath sounds. No wheezing, rhonchi or rales.   Musculoskeletal:      Right lower leg: No edema.      Left lower leg: No edema.   Skin:     General: Skin is warm and dry.      Findings: Bruising (left knee) present. No rash.   Neurological:      Mental Status: She is alert. Mental status is at baseline.      Gait: Gait normal.   Psychiatric:         Mood and Affect: Mood normal.         Behavior: Behavior normal.         Thought Content: Thought content normal.         Judgment: Judgment normal.             Assessment:       Assessment & Plan    1. Assessed recent fall at [facility], noting bruising and soreness but no significant injury.  2. Evaluated pain management, acknowledging limited efficacy of Advil.  3. UTI status and cardiac health reported as good.  4. Noted recent ENT visit with Dr. Mercado for earwax removal, resulting in slight hearing improvement.  5. Blood sugar control very good.  6. Blood pressure excellent.  7. Discussed ongoing eye care with retina specialist, including regular injections for wet AMD.           Plan:       1. Fall, initial encounter  Comments:  tripped at the [facility]. no injury. no loc    2. Gastroesophageal reflux disease without esophagitis  Assessment & Plan:  Sees GI and stable on bid famotidine.     Orders:  -     famotidine (PEPCID) 20 MG  tablet; Take 1 tablet (20 mg total) by mouth 2 (two) times daily.  Dispense: 180 tablet; Refill: 3    3. Abnormal gait  Assessment & Plan:  Does not use an assistive device but did trip on a parking lot      4. Recurrent UTI  Overview:  Previous cultures  2020- E coli resistant to ampicillin, cipro, macrobid, bactrim.  Sensitive to augmentin, cefepime,       Assessment & Plan:  Stable. Continue current medications and regular followup.        5. Frequent PVCs  Assessment & Plan:  Stable. Continue current medications and regular followup.        6. Impacted cerumen of both ears  Assessment & Plan:  Saw ENT and got the cerumen flushed and doing well      7. Type 2 diabetes mellitus without complication, without long-term current use of insulin  Assessment & Plan:  Lab Results   Component Value Date    HGBA1C 5.6 12/19/2024     Glucoses well controlled  Diabetes Medications              alogliptin (NESINA) 25 mg Tab Take 1 tablet by mouth once daily.          Stable. Continue current medications and regular followup.      Orders:  -     Hemoglobin A1C; Future; Expected date: 03/19/2025  -     rosuvastatin (CRESTOR) 5 MG tablet; Take 1 tablet (5 mg total) by mouth every other day.  Dispense: 90 tablet; Refill: 3  -     alogliptin (NESINA) 25 mg Tab; Take 1 tablet by mouth once daily.  Dispense: 90 tablet; Refill: 3    8. Dysphagia, unspecified type  Assessment & Plan:  No dysphagia currently      9. Postmenopausal  -     DXA Bone Density Axial Skeleton 1 or More Sites W/TBS (XPD); Future; Expected date: 03/19/2025         This note was generated with the assistance of ambient listening technology. Verbal consent was obtained by the patient and accompanying visitor(s) for the recording of patient appointment to facilitate this note. I attest to having reviewed and edited the generated note for accuracy, though some syntax or spelling errors may persist. Please contact the author of this note for any  clarification.                    [1]   Patient Active Problem List  Diagnosis    Recurrent UTI    Dysphagia    Type 2 diabetes mellitus without complication, without long-term current use of insulin    Low serum vitamin D    B12 deficiency    Macular degeneration    Frequent PVCs    Gastroesophageal reflux disease without esophagitis    Neuralgia, postherpetic    Aortic atherosclerosis    Impacted cerumen of both ears    Hearing loss    Ringing in ear, bilateral    Abnormal gait

## 2025-03-19 NOTE — ASSESSMENT & PLAN NOTE
Lab Results   Component Value Date    HGBA1C 5.6 12/19/2024     Glucoses well controlled  Diabetes Medications              alogliptin (NESINA) 25 mg Tab Take 1 tablet by mouth once daily.          Stable. Continue current medications and regular followup.

## 2025-03-20 ENCOUNTER — LAB VISIT (OUTPATIENT)
Dept: LAB | Facility: HOSPITAL | Age: 85
End: 2025-03-20
Attending: STUDENT IN AN ORGANIZED HEALTH CARE EDUCATION/TRAINING PROGRAM
Payer: MEDICARE

## 2025-03-20 ENCOUNTER — RESULTS FOLLOW-UP (OUTPATIENT)
Dept: FAMILY MEDICINE | Facility: CLINIC | Age: 85
End: 2025-03-20

## 2025-03-20 DIAGNOSIS — E11.9 TYPE 2 DIABETES MELLITUS WITHOUT COMPLICATION, WITHOUT LONG-TERM CURRENT USE OF INSULIN: ICD-10-CM

## 2025-03-20 LAB
ESTIMATED AVG GLUCOSE: 117 MG/DL (ref 68–131)
HBA1C MFR BLD: 5.7 % (ref 4–5.6)

## 2025-03-20 PROCEDURE — 36415 COLL VENOUS BLD VENIPUNCTURE: CPT | Performed by: STUDENT IN AN ORGANIZED HEALTH CARE EDUCATION/TRAINING PROGRAM

## 2025-03-20 PROCEDURE — 83036 HEMOGLOBIN GLYCOSYLATED A1C: CPT | Performed by: STUDENT IN AN ORGANIZED HEALTH CARE EDUCATION/TRAINING PROGRAM

## 2025-04-01 ENCOUNTER — OFFICE VISIT (OUTPATIENT)
Dept: URGENT CARE | Facility: CLINIC | Age: 85
End: 2025-04-01
Payer: MEDICARE

## 2025-04-01 VITALS
OXYGEN SATURATION: 99 % | SYSTOLIC BLOOD PRESSURE: 169 MMHG | WEIGHT: 117 LBS | RESPIRATION RATE: 18 BRPM | TEMPERATURE: 98 F | HEIGHT: 65 IN | BODY MASS INDEX: 19.49 KG/M2 | DIASTOLIC BLOOD PRESSURE: 84 MMHG | HEART RATE: 85 BPM

## 2025-04-01 DIAGNOSIS — S43.102A CLOSED DISLOCATION OF LEFT CLAVICLE, INITIAL ENCOUNTER: Primary | ICD-10-CM

## 2025-04-01 DIAGNOSIS — M25.512 ACUTE PAIN OF LEFT SHOULDER: ICD-10-CM

## 2025-04-01 DIAGNOSIS — T14.90XA INJURY: ICD-10-CM
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
## 2025-04-01 PROCEDURE — 99214 OFFICE O/P EST MOD 30 MIN: CPT | Mod: S$GLB,,, | Performed by: NURSE PRACTITIONER

## 2025-04-01 RX ORDER — TRAMADOL HYDROCHLORIDE 50 MG/1
50 TABLET ORAL EVERY 8 HOURS PRN
Qty: 10 TABLET | Refills: 0 | Status: SHIPPED | OUTPATIENT
Start: 2025-04-01

## 2025-04-01 NOTE — PROGRESS NOTES
"Subjective:       Patient ID: Stacie Hudson is a 84 y.o. female.    Vitals:  height is 5' 5" (1.651 m) and weight is 53.1 kg (117 lb). Her oral temperature is 97.9 °F (36.6 °C). Her blood pressure is 169/84 (abnormal) and her pulse is 85. Her respiration is 18 and oxygen saturation is 99%.     Chief Complaint: Head Injury and Shoulder Injury    This is a 84 y.o. female who presents today with a chief complaint of patient states she fell about an hour ago.  Patient reports that she was walking up the stairs of her home she tripped and fell backwards down the steps approximately 5 steps.  She does report that she hit her left shoulder and her posterior scalp on the carpeted stairs.  Patient denies any lacerations or skin sores.  Patient denies any head pain lightheadedness dizziness or passing out during the incident.  Patient denies any change in vision.  Patient does report monitor severe pain of her left shoulder with decreased range of motion due to the pain.  Patient reports pain level 8/10    Patient presents with:  Head Injury  Shoulder Injury        Head Injury   The incident occurred less than 1 hour ago. The injury mechanism was a fall. There was no loss of consciousness. There was no blood loss. The quality of the pain is described as aching. The pain is at a severity of 3/10. The pain is mild. Pertinent negatives include no disorientation, numbness or weakness. She has tried nothing for the symptoms.   Shoulder Injury   The incident occurred at home. The left shoulder is affected. The incident occurred less than 1 hour ago. The injury mechanism was a fall. The pain is at a severity of 6/10. The pain is mild. Pertinent negatives include no numbness. The symptoms are aggravated by movement and overhead lifting. She has tried nothing for the symptoms.       Constitution: Negative.   Musculoskeletal:  Positive for pain, joint swelling and abnormal ROM of joint.   Neurological:  Negative for dizziness, " light-headedness, passing out, facial drooping, speech difficulty, coordination disturbances, loss of balance, disorientation, altered mental status, loss of consciousness, numbness and tingling.   Psychiatric/Behavioral:  Negative for altered mental status, disorientation and confusion.            Objective:      Physical Exam   Constitutional: She is oriented to person, place, and time. She appears well-developed. She is cooperative.  Non-toxic appearance. She does not appear ill. No distress.   HENT:   Head: Normocephalic and atraumatic.   Ears:   Right Ear: Hearing, tympanic membrane, external ear and ear canal normal.   Left Ear: Hearing, tympanic membrane, external ear and ear canal normal.   Nose: Nose normal. No mucosal edema or nasal deformity. No epistaxis. Right sinus exhibits no maxillary sinus tenderness and no frontal sinus tenderness. Left sinus exhibits no maxillary sinus tenderness and no frontal sinus tenderness.   Mouth/Throat: Uvula is midline, oropharynx is clear and moist and mucous membranes are normal. No trismus in the jaw. Normal dentition. No uvula swelling.   No abrasions erythema bruising or lacerations noted to scalp.  Patient reports only mild dull pain to this area.  Patient denies any sharp pain.  Patient denies any reproducible pain      Comments: No abrasions erythema bruising or lacerations noted to scalp.  Patient reports only mild dull pain to this area.  Patient denies any sharp pain.  Patient denies any reproducible pain  Eyes: Conjunctivae and lids are normal. No visual field deficit is present.      Comments: Patient denies any change in vision or vision complaints.   Neck: Trachea normal and phonation normal. Neck supple.      Comments: Patient denies any neck pain or pain with range of motion. No neck rigidity present. No decreased range of motion present. No pain with movement present. No spinous process tenderness present. No muscular tenderness present.    Cardiovascular: Normal rate, regular rhythm, normal heart sounds and normal pulses.   Pulmonary/Chest: Effort normal and breath sounds normal.   Abdominal: Normal appearance and bowel sounds are normal. Soft.   Musculoskeletal:      Left shoulder: She exhibits decreased range of motion (related to increased pain with movement) and tenderness (patient reports generalized pain to the shoulder that is moderate and increases with any movement.). She exhibits no swelling.      Comments: Mild erythremic superficial abrasion noted to superior aspect of left shoulder. No open areas noted.  No loss of sensation tingling numbness noted to left arm hand or fingers.  Capillary refills within normal limits.  Normal skin color and turgor noted to left arm.   Neurological: She is alert and oriented to person, place, and time. She has normal motor skills and normal sensation. She displays facial symmetry. She exhibits normal muscle tone. Coordination: Romberg sign negative. Gait and coordination normal. Coordination normal.      Comments: No changes to vision or speech.  Patient's balance within normal limits.  Patient states that her head feels fine and she did not feel any lightheadedness dizziness or feeling of passing out.  Patient denies any head pain at this time.  Patient's neuro exam otherwise negative.   Skin: Skin is warm, dry, intact and not diaphoretic.   Psychiatric: Her speech is normal and behavior is normal. Judgment and thought content normal.   Nursing note and vitals reviewed.        Past medical history and current medications reviewed.     XR SHOULDER COMPLETE 2 OR MORE VIEWS LEFT  Narrative: EXAMINATION:  XR SHOULDER COMPLETE 2 OR MORE VIEWS LEFT    CLINICAL HISTORY:  Injury, unspecified, initial encounter    TECHNIQUE:  Two or three views of the left shoulder were performed.    COMPARISON:  None    FINDINGS:  There is a proximally 2 cm superior subluxation of the distal clavicle from the AC joint consistent  with ligamentous injury.  There is overlying soft tissue swelling.  No linear lucency to suggest an acute fracture.  Humeral head normally positioned within the glenoid cavity.  Impression: Superior subluxation of the clavicle consistent with AC joint ligamentous injury.    This report was flagged in Epic as abnormal.    Electronically signed by: Reynaldo Wilson  Date:    04/01/2025  Time:    11:56     Assessment:           1. Closed dislocation of left clavicle, initial encounter    2. Injury    3. Fall, initial encounter    4. Acute pain of left shoulder              Plan:         Closed dislocation of left clavicle, initial encounter  -     traMADoL (ULTRAM) 50 mg tablet; Take 1 tablet (50 mg total) by mouth every 8 (eight) hours as needed for Pain.  Dispense: 10 tablet; Refill: 0  -     Ambulatory referral/consult to Orthopedics    Injury  -     XR SHOULDER COMPLETE 2 OR MORE VIEWS LEFT; Future; Expected date: 04/01/2025    Fall, initial encounter    Acute pain of left shoulder  -     traMADoL (ULTRAM) 50 mg tablet; Take 1 tablet (50 mg total) by mouth every 8 (eight) hours as needed for Pain.  Dispense: 10 tablet; Refill: 0  -     Ambulatory referral/consult to Orthopedics             Patient Instructions   Report directly to the emergency department for any acute concerns as directed.  Follow-up with orthopedic specialist as soon as possible for further evaluation.           Robin Blandon, FARIHA-C   Medical Decision Making:   Urgent Care Management:  Patient's neuro exam negative in the clinic.  Patient denies any head pain lightheadedness dizziness change in vision or feeling of passing out.  Patient's gait steady with no abnormal findings.  I discussed with patient concerning signs and symptoms after a fall and have recommended reporting directly to emergency department should any of this occur.  Patient and family member verbalized understanding and agreed with a report to emergency department for any  abnormal symptoms that may occur.    I discussed patient's x-ray with her and her .  Also spoke to patient's son who is a neurologist at a local specialty clinic [facility]. I will send a referral to [facility] orthopedic specialist for further evaluation and treatment. I have recommended no use of left arm and immobilize with arm sling. I provided pain medication instructions. Pt and family members verbalize understanding and agree with plan of care.

## 2025-04-01 NOTE — PATIENT INSTRUCTIONS
Report directly to the emergency department for any acute concerns as directed.  Follow-up with orthopedic specialist as soon as possible for further evaluation.

## 2025-05-15 DIAGNOSIS — M25.512 LEFT SHOULDER PAIN: Primary | ICD-10-CM

## 2025-05-21 ENCOUNTER — CLINICAL SUPPORT (OUTPATIENT)
Dept: REHABILITATION | Facility: HOSPITAL | Age: 85
End: 2025-05-21
Payer: MEDICARE

## 2025-05-21 DIAGNOSIS — M25.60 DECREASED RANGE OF MOTION WITH DECREASED STRENGTH: Primary | ICD-10-CM

## 2025-05-21 DIAGNOSIS — R53.1 DECREASED RANGE OF MOTION WITH DECREASED STRENGTH: Primary | ICD-10-CM

## 2025-05-21 DIAGNOSIS — R52 PAIN AGGRAVATED BY ACTIVITIES OF DAILY LIVING: ICD-10-CM

## 2025-05-21 PROCEDURE — 97166 OT EVAL MOD COMPLEX 45 MIN: CPT | Mod: PN

## 2025-05-21 PROCEDURE — 97140 MANUAL THERAPY 1/> REGIONS: CPT | Mod: PN

## 2025-05-21 NOTE — PROGRESS NOTES
Outpatient Rehab    Occupational Therapy Evaluation    Patient Name: Stacie Hudson  MRN: [id number]  YOB: 1940  Encounter Date: 5/21/2025    Therapy Diagnosis:   Encounter Diagnoses   Name Primary?    Decreased range of motion with decreased strength Yes    Pain aggravated by activities of daily living      Physician: Reynaldo Chavarria MD    Physician Orders: Eval and Treat  Medical Diagnosis: Left shoulder pain    Visit # / Visits Authorized: 1 / 1  Insurance Authorization Period: 5/15/2025 to 5/15/2026  Date of Evaluation: 5/21/2025  Plan of Care Certification: 5/21/2025 to 8/21/25     Time In: 1437   Time Out: 1527  Total Time (in minutes): 50   Total Billable Time (in minutes): 48    Intake Outcome Measure for FOTO Survey    Therapist reviewed FOTO scores for Stacie Hudson on 5/21/2025.   FOTO report - see Media section or FOTO account episode details.     Intake Score: 54%    Precautions:  Strengthening Precautions: light household chores, no strenuous or forceful lifting, pushing, or pulling using LUE         Subjective   History of Present Illness  Stacie is a 84 y.o. female who reports to occupational therapy with a chief concern of left shoulder pain s/p.     The patient reports a medical diagnosis of M25.512 (ICD-10-CM) - Left shoulder pain.  Patient reports a surgery of left RTS ORIF. Surgery occurred on 04/07/25. Diagnostic tests related to this condition: X-ray.   X-Ray Details: There is a proximally 2 cm superior subluxation of the distal clavicle from the AC joint consistent with ligamentous injury.  There is overlying soft tissue swelling.  No linear lucency to suggest an acute fracture.  Humeral head normally positioned within the glenoid cavity.    Dominant Hand: Right  History of Present Condition/Illness: Patient sustained blunt trauma to the left shoulder on 4/1/25 when a dog tripped her, causing her to fall backwards on the stairs. Patient presented to urgent care, and x-ray  imaging identified a proximally 2 cm superior subluxation of the distal clavicle from the AC joint. Patient was referred to orthopedics, then she proceeded with a left RTS ORIF in early April 2025. Patient presented wearing a sling, but patient stated she only uses this occasionally to reduce risk of joint stiffness. She has been performing her own AROM exercises at home to enhance her mobility.    Activities of Daily Living  Social history was obtained from Patient.    General Prior Level of Function Comments: independent  General Current Level of Function Comments: independent  Patient Responsibilities: Driving, Community mobility, Financial management, Home management, Health management, Laundry, Meal prep, Personal ADL, Shopping    Previously independent with activities of daily living? Yes     Currently independent with activities of daily living? Yes     Patient has difficulty in overhead dressing, bathing, and grooming. She has difficulty reaching behind her back. She experiences increased pain when pushing down on lever to flush toilet.    Previously independent with instrumental activities of daily living? Yes     Currently independent with instrumental activities of daily living? Yes     Patient is limited in all overhead activities including putting away dishes and laundry. She cannot lift a quart of juice due to weakness. She feels uncomfortable pushing her seatbelt into the buckle.        Pain     Patient reports a current pain level of 4/10. Pain at best is reported as 0/10.    Location: left shoulder  Clinical Progression (since onset): Stable  Pain Qualities: Dull  Pain-Relieving Factors: Medications - over-the-counter  Pain-Aggravating Factors: Driving, Holding objects, Reaching         Treatment History  Treatments  Previously Received Treatments: No  Currently Receiving Treatments: No    Living Arrangements  Living Situation  Housing: Home independently  Living Arrangements: Spouse/significant  other  Support Systems: Family members, Spouse/significant other        Employment  Patient does not report that: Does the patient's condition impact their ability to work?  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    Stacie Hudson  has a past surgical history that includes Hysterectomy and Breast biopsy.    Stacie has a current medication list which includes the following prescription(s): alogliptin, famotidine, gabapentin, lidocaine hcl 2%, metoprolol succinate, nitrofurantoin, rosuvastatin, tramadol, and vit c/e/zn/coppr/lutein/zeaxan.    Review of patient's allergies indicates:   Allergen Reactions    Corticosteroids (glucocorticoids) Other (See Comments)     Headaches    Dexamethasone Other (See Comments)     headache    Pcn [penicillins] Itching    Prednisone (bulk) Other (See Comments)     headache    Sulfa (sulfonamide antibiotics)         Objective   Bracing  Patient presents with a Left shoulder brace. The shoulder brace type is Randy sling.           Posture        Shoulders are Rounded.             Shoulder Range of Motion  Right Shoulder   Active (deg) Passive (deg) Pain   Flexion 150       Extension 60       Scaption         ABduction 120       ADduction         Horizontal ABduction         Horizontal ADduction         External Rotation (Shoulder ABducted 0 degrees) 40       External Rotation (Shoulder ABducted 45 degrees)         External Rotation (Shoulder ABducted 90 degrees)         Internal Rotation (Shoulder ABducted 0 degrees)         Internal Rotation (Shoulder ABducted 45 degrees)         Internal Rotation (Shoulder ABducted 90 degrees)           Left Shoulder   Active (deg) Passive (deg) Pain   Flexion 90   Yes   Extension 50       Scaption         ABduction 90    Yes   ADduction         Horizontal ABduction         Horizontal ADduction         External Rotation (Shoulder ABducted 0 degrees) 25       External Rotation (Shoulder ABducted 45 degrees)         External Rotation (Shoulder ABducted 90 degrees)         Internal Rotation (Shoulder ABducted 0 degrees)         Internal Rotation (Shoulder ABducted 45 degrees)         Internal Rotation (Shoulder ABducted 90 degrees)                  Elbow/Forearm Range of Motion   Left Elbow/Forearm   Active (deg) Passive (deg) Pain   Flexion  (WNL)       Extension  (WNL)       Forearm Pronation         Forearm Supination                       Shoulder Strength - Planes of Motion   Right Strength Right Pain Left Strength Left  Pain   Flexion 4+         Extension 4+         ABduction 4+         ADduction 4+         Horizontal ABduction 4+         Horizontal ADduction 4+         Internal Rotation 0° 4+         Internal Rotation 90° 4+         External Rotation 0° 4+         External Rotation 90° 4+             Shoulder Strength Details  Left not tested secondary to postoperative status    Elbow Strength   Right Strength Right Pain Left Strength Left  Pain   Flexion (C6) 4+   4+     Extension (C7) 4+   4+          Forearm Strength   Right Strength Right Pain Left Strength Left  Pain   Pronation 4+   4+     Supination 4+   4+         Right  Strength  Right Hand Dynamometer Position: 2  Elbow Position Forearm Position Trial 1 (lbs) Trial 2  (lbs) Trial 3  (lbs) Average  (lbs) Pain   Flexed Neutral 25 30 35 30         Left  Strength  Left Hand Dynamometer Position: 2  Elbow Position Forearm Position Trial 1 (lbs) Trial 2 (lbs) Trial 3 (lbs) Average (lbs) Pain   Flexed Neutral 23 24 25 24                   Treatment:  Therapeutic Exercise  TE 1: shoulder flexion table slide for 1 min hold, 3 reps  TE 2: supine shoulder ER stretch using stick for 1 min hold, 3 reps  Manual Therapy  MT 1: OT performed PROM left shoulder flexion,  scaption, abduction, and ER for 1 min hold, 3 reps each    Time Entry(in minutes):  OT Evaluation (Moderate) Time Entry: 30  Manual Therapy Time Entry: 12  Therapeutic Exercise Time Entry: 6    Assessment & Plan   Assessment  Stacie presents with a condition of Moderate complexity.   Presentation of Symptoms: Stable  Will Comorbidities Impact Care: No       ADL Limitations : Bathing/showering, Dressing, Personal hygiene and grooming, Toileting and toilet hygiene  IADL Limitations: Driving, Grocery/shopping, Health management and maintenance, Home establishment and management, Meal preparation and cleanup, Safety and emergency maintenance  Functional Limitations: Activity tolerance, Carrying objects, Gross motor coordination, Pain with ADLs/IADLs, Pain when reaching, Range of motion, Proprioception                 Evaluation/Treatment Response: Patient responded to treatment well  Patient Goal for Therapy (OT): to increase functional ROM  Prognosis: Good  Assessment Details: Patient presented s/p left shoulder RTS ORIF. She demonstrated decreased range of motion, muscular strength, muscular endurance, and activity tolerance which impairs her engagement in ADL/IADL. Patient reported greatest difficulty with overhead reaching, lifting, and forceful downward motions such as buckling a seatbelt or pushing a toilet lever. She demonstrated good joint mobility which patient attributes to her willingness to perform AROM exercises on her own. Patient demonstrated good understanding of postoperative precautions, role of rehab, and home exercise program.    Plan  From an occupational therapy perspective, the patient would benefit from: Skilled Rehab Services    Planned therapy interventions include: Therapeutic exercise, Therapeutic activities, Manual therapy, Neuromuscular re-education, ADLs/IADLs, and Orthotic management and training.    Planned modalities to include: Ultrasound, Thermotherapy (hot pack), Electrical stimulation  - passive/unattended, and Cryotherapy (cold pack).        Visit Frequency: 2 times Per Week for 8 Weeks.       This plan was discussed with Patient.   Discussion participants: Agreed Upon Plan of Care             Patient's spiritual, cultural, and educational needs considered and patient agreeable to plan of care and goals.     Education  Education was done with Patient. The patient's learning style includes Listening, Pictures/video, and Demonstration. The patient Demonstrates understanding and Verbalizes understanding.                 Goals:   Active       long-term       Patient will improve status score on FOTO by 10% to demonstrate an increase in self-perceived functional performance.        Start:  05/21/25    Expected End:  07/16/25            patient will achieve left shoulder range of motion WFL needed to perform ADL/IADL tasks with enhanced independence.       Start:  05/21/25    Expected End:  07/16/25            patient will achieve LUE strength WFL needed to engage in ADL/IADL tasks  such as putting away groceries.       Start:  05/21/25    Expected End:  07/16/25               short-term       Patient to be independent with home exercise program as issued by Occupational Therapist, noted through return demonstration to increase clinical carry over.        Start:  05/21/25    Expected End:  06/18/25            patient will demonstrate 10 degree increase in left shoulder overhead motion needed to engage in ADLs such as grooming, dressing, and bathing with enhanced independence.       Start:  05/21/25    Expected End:  06/18/25            patient will report 3 point reduction in average left shoulder pain to 2/10 or less during functional or therapeutic task performance.       Start:  05/21/25    Expected End:  07/16/25                Jossy Gann, OT

## 2025-05-21 NOTE — LETTER
May 21, 2025  Reynaldo Chavarria MD  [address]  [address]  [facility] [phone]    To whom it may concern,     The attached plan of care is being sent to you for review and reference.    You may indicate your approval by signing the document electronically, or by faxing/mailing a signed copy of the final page of this document back to the attention of Jossy Gann OT:         Plan of Care 5/21/25   Effective from: 5/21/2025  Effective to: 8/21/2025    Plan ID: [id number]            Participants as of Finalize on 5/21/2025    Name Type Comments Contact Info    Reynaldo Chavarria MD Referring Provider  [phone]    Jossy Gann OT Occupational Therapist         Last Plan Note     Author: Jossy Gann OT Status: Signed Last edited: 5/21/2025  2:30 PM         Outpatient Rehab    Occupational Therapy Evaluation    Patient Name: Stacie Hudson  MRN: [id number]  YOB: 1940  Encounter Date: 5/21/2025    Therapy Diagnosis:   Encounter Diagnoses   Name Primary?    Decreased range of motion with decreased strength Yes    Pain aggravated by activities of daily living      Physician: Reynaldo Chavarria MD    Physician Orders: Eval and Treat  Medical Diagnosis: Left shoulder pain    Visit # / Visits Authorized: 1 / 1  Insurance Authorization Period: 5/15/2025 to 5/15/2026  Date of Evaluation: 5/21/2025  Plan of Care Certification: 5/21/2025 to 8/21/25     Time In: 1437   Time Out: 1527  Total Time (in minutes): 50   Total Billable Time (in minutes): 48    Intake Outcome Measure for FOTO Survey    Therapist reviewed FOTO scores for Stacie Hudson on 5/21/2025.   FOTO report - see Media section or FOTO account episode details.     Intake Score: 54%    Precautions:  Strengthening Precautions: light household chores, no strenuous or forceful lifting, pushing, or pulling using LUE         Subjective   History of Present Illness  Stacie is a 84 y.o. female who reports to occupational therapy with a chief  concern of left shoulder pain s/p.     The patient reports a medical diagnosis of M25.512 (ICD-10-CM) - Left shoulder pain.  Patient reports a surgery of left RTS ORIF. Surgery occurred on 04/07/25. Diagnostic tests related to this condition: X-ray.   X-Ray Details: There is a proximally 2 cm superior subluxation of the distal clavicle from the AC joint consistent with ligamentous injury.  There is overlying soft tissue swelling.  No linear lucency to suggest an acute fracture.  Humeral head normally positioned within the glenoid cavity.    Dominant Hand: Right  History of Present Condition/Illness: Patient sustained blunt trauma to the left shoulder on 4/1/25 when a dog tripped her, causing her to fall backwards on the stairs. Patient presented to urgent care, and x-ray imaging identified a proximally 2 cm superior subluxation of the distal clavicle from the AC joint. Patient was referred to orthopedics, then she proceeded with a left RTS ORIF in early April 2025. Patient presented wearing a sling, but patient stated she only uses this occasionally to reduce risk of joint stiffness. She has been performing her own AROM exercises at home to enhance her mobility.    Activities of Daily Living  Social history was obtained from Patient.    General Prior Level of Function Comments: independent  General Current Level of Function Comments: independent  Patient Responsibilities: Driving, Community mobility, Financial management, Home management, Health management, Laundry, Meal prep, Personal ADL, Shopping    Previously independent with activities of daily living? Yes     Currently independent with activities of daily living? Yes     Patient has difficulty in overhead dressing, bathing, and grooming. She has difficulty reaching behind her back. She experiences increased pain when pushing down on lever to flush toilet.    Previously independent with instrumental activities of daily living? Yes     Currently independent with  instrumental activities of daily living? Yes     Patient is limited in all overhead activities including putting away dishes and laundry. She cannot lift a quart of juice due to weakness. She feels uncomfortable pushing her seatbelt into the buckle.        Pain     Patient reports a current pain level of 4/10. Pain at best is reported as 0/10.    Location: left shoulder  Clinical Progression (since onset): Stable  Pain Qualities: Dull  Pain-Relieving Factors: Medications - over-the-counter  Pain-Aggravating Factors: Driving, Holding objects, Reaching         Treatment History  Treatments  Previously Received Treatments: No  Currently Receiving Treatments: No    Living Arrangements  Living Situation  Housing: Home independently  Living Arrangements: Spouse/significant other  Support Systems: Family members, Spouse/significant other        Employment  Patient does not report that: Does the patient's condition impact their ability to work?  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    Stacie Hudson  has a past surgical history that includes Hysterectomy and Breast biopsy.    Stacie has a current medication list which includes the following prescription(s): alogliptin, famotidine, gabapentin, lidocaine hcl 2%, metoprolol succinate, nitrofurantoin, rosuvastatin, tramadol, and vit c/e/zn/coppr/lutein/zeaxan.    Review of patient's allergies indicates:   Allergen Reactions    Corticosteroids (glucocorticoids) Other (See Comments)     Headaches    Dexamethasone Other (See Comments)     headache    Pcn [penicillins] Itching    Prednisone (bulk) Other (See Comments)     headache    Sulfa (sulfonamide antibiotics)         Objective   Bracing  Patient presents with a Left shoulder brace. The  shoulder brace type is Randy sling.           Posture        Shoulders are Rounded.             Shoulder Range of Motion  Right Shoulder   Active (deg) Passive (deg) Pain   Flexion 150       Extension 60       Scaption         ABduction 120       ADduction         Horizontal ABduction         Horizontal ADduction         External Rotation (Shoulder ABducted 0 degrees) 40       External Rotation (Shoulder ABducted 45 degrees)         External Rotation (Shoulder ABducted 90 degrees)         Internal Rotation (Shoulder ABducted 0 degrees)         Internal Rotation (Shoulder ABducted 45 degrees)         Internal Rotation (Shoulder ABducted 90 degrees)           Left Shoulder   Active (deg) Passive (deg) Pain   Flexion 90   Yes   Extension 50       Scaption         ABduction 90   Yes   ADduction         Horizontal ABduction         Horizontal ADduction         External Rotation (Shoulder ABducted 0 degrees) 25       External Rotation (Shoulder ABducted 45 degrees)         External Rotation (Shoulder ABducted 90 degrees)         Internal Rotation (Shoulder ABducted 0 degrees)         Internal Rotation (Shoulder ABducted 45 degrees)         Internal Rotation (Shoulder ABducted 90 degrees)                  Elbow/Forearm Range of Motion   Left Elbow/Forearm   Active (deg) Passive (deg) Pain   Flexion  (WNL)       Extension  (WNL)       Forearm Pronation         Forearm Supination                       Shoulder Strength - Planes of Motion   Right Strength Right Pain Left Strength Left  Pain   Flexion 4+         Extension 4+         ABduction 4+         ADduction 4+         Horizontal ABduction 4+         Horizontal ADduction 4+         Internal Rotation 0° 4+         Internal Rotation 90° 4+         External Rotation 0° 4+         External Rotation 90° 4+             Shoulder Strength Details  Left not tested secondary to postoperative status    Elbow Strength   Right Strength Right Pain Left Strength Left  Pain   Flexion (C6)  4+   4+     Extension (C7) 4+   4+          Forearm Strength   Right Strength Right Pain Left Strength Left  Pain   Pronation 4+   4+     Supination 4+   4+         Right  Strength  Right Hand Dynamometer Position: 2  Elbow Position Forearm Position Trial 1 (lbs) Trial 2  (lbs) Trial 3  (lbs) Average  (lbs) Pain   Flexed Neutral 25 30 35 30         Left  Strength  Left Hand Dynamometer Position: 2  Elbow Position Forearm Position Trial 1 (lbs) Trial 2 (lbs) Trial 3 (lbs) Average (lbs) Pain   Flexed Neutral 23 24 25 24                   Treatment:  Therapeutic Exercise  TE 1: shoulder flexion table slide for 1 min hold, 3 reps  TE 2: supine shoulder ER stretch using stick for 1 min hold, 3 reps  Manual Therapy  MT 1: OT performed PROM left shoulder flexion, scaption, abduction, and ER for 1 min hold, 3 reps each    Time Entry(in minutes):  OT Evaluation (Moderate) Time Entry: 30  Manual Therapy Time Entry: 12  Therapeutic Exercise Time Entry: 6    Assessment & Plan   Assessment  Stacie presents with a condition of Moderate complexity.   Presentation of Symptoms: Stable  Will Comorbidities Impact Care: No       ADL Limitations : Bathing/showering, Dressing, Personal hygiene and grooming, Toileting and toilet hygiene  IADL Limitations: Driving, Grocery/shopping, Health management and maintenance, Home establishment and management, Meal preparation and cleanup, Safety and emergency maintenance  Functional Limitations: Activity tolerance, Carrying objects, Gross motor coordination, Pain with ADLs/IADLs, Pain when reaching, Range of motion, Proprioception                 Evaluation/Treatment Response: Patient responded to treatment well  Patient Goal for Therapy (OT): to increase functional ROM  Prognosis: Good  Assessment Details: Patient presented s/p left shoulder RTS ORIF. She demonstrated decreased range of motion, muscular strength, muscular endurance, and activity tolerance which impairs her engagement in  ADL/IADL. Patient reported greatest difficulty with overhead reaching, lifting, and forceful downward motions such as buckling a seatbelt or pushing a toilet lever. She demonstrated good joint mobility which patient attributes to her willingness to perform AROM exercises on her own. Patient demonstrated good understanding of postoperative precautions, role of rehab, and home exercise program.    Plan  From an occupational therapy perspective, the patient would benefit from: Skilled Rehab Services    Planned therapy interventions include: Therapeutic exercise, Therapeutic activities, Manual therapy, Neuromuscular re-education, ADLs/IADLs, and Orthotic management and training.    Planned modalities to include: Ultrasound, Thermotherapy (hot pack), Electrical stimulation - passive/unattended, and Cryotherapy (cold pack).        Visit Frequency: 2 times Per Week for 8 Weeks.       This plan was discussed with Patient.   Discussion participants: Agreed Upon Plan of Care             Patient's spiritual, cultural, and educational needs considered and patient agreeable to plan of care and goals.     Education  Education was done with Patient. The patient's learning style includes Listening, Pictures/video, and Demonstration. The patient Demonstrates understanding and Verbalizes understanding.                 Goals:   Active       long-term       Patient will improve status score on FOTO by 10% to demonstrate an increase in self-perceived functional performance.        Start:  05/21/25    Expected End:  07/16/25            patient will achieve left shoulder range of motion WFL needed to perform ADL/IADL tasks with enhanced independence.       Start:  05/21/25    Expected End:  07/16/25            patient will achieve LUE strength WFL needed to engage in ADL/IADL tasks  such as putting away groceries.       Start:  05/21/25    Expected End:  07/16/25               short-term       Patient to be independent with home exercise  program as issued by Occupational Therapist, noted through return demonstration to increase clinical carry over.        Start:  05/21/25    Expected End:  06/18/25            patient will demonstrate 10 degree increase in left shoulder overhead motion needed to engage in ADLs such as grooming, dressing, and bathing with enhanced independence.       Start:  05/21/25    Expected End:  06/18/25            patient will report 3 point reduction in average left shoulder pain to 2/10 or less during functional or therapeutic task performance.       Start:  05/21/25    Expected End:  07/16/25                Jossy Gann OT           Current Participants as of 5/21/2025    Name Type Comments Contact Info    Reynaldo Chavarria MD Referring Provider  [phone]    Signature pending    Jossy Gann OT Occupational Therapist                  Sincerely,      Jossy Gann OT  [facility]                                                            Dear Jossy Gann OT,    RE: Ms. Stacie Hudson, MRN: [id number]    I certify that I have reviewed the attached plan of care and agree to the details within.        ___________________________  ___________________________  Provider Printed Name   Provider Signed Name      ___________________________  Date and Time

## 2025-05-23 ENCOUNTER — CLINICAL SUPPORT (OUTPATIENT)
Dept: REHABILITATION | Facility: HOSPITAL | Age: 85
End: 2025-05-23
Payer: MEDICARE

## 2025-05-23 DIAGNOSIS — R53.1 DECREASED RANGE OF MOTION WITH DECREASED STRENGTH: Primary | ICD-10-CM

## 2025-05-23 DIAGNOSIS — R52 PAIN AGGRAVATED BY ACTIVITIES OF DAILY LIVING: ICD-10-CM

## 2025-05-23 DIAGNOSIS — M25.60 DECREASED RANGE OF MOTION WITH DECREASED STRENGTH: Primary | ICD-10-CM

## 2025-05-23 PROCEDURE — 97110 THERAPEUTIC EXERCISES: CPT | Mod: PN

## 2025-05-23 PROCEDURE — 97140 MANUAL THERAPY 1/> REGIONS: CPT | Mod: PN

## 2025-05-23 NOTE — PROGRESS NOTES
Outpatient Rehab    Occupational Therapy Visit    Patient Name: Stacie Hudson  MRN: [id number]  YOB: 1940  Encounter Date: 5/23/2025    Therapy Diagnosis:   Encounter Diagnoses   Name Primary?    Decreased range of motion with decreased strength Yes    Pain aggravated by activities of daily living      Physician: Reynaldo Chavarria MD    Physician Orders: Eval and Treat  Medical Diagnosis: Left shoulder pain    Visit # / Visits Authorized: 1 / 10  Insurance Authorization Period: 5/22/2025 to 12/31/2025  Date of Evaluation: 5/21/2025  Plan of Care Certification: 5/21/2025 to 8/21/2025      Time In: 1328   Time Out: 1428  Total Time (in minutes): 60   Total Billable Time (in minutes): 55    FOTO:  Intake Score:  %  Survey Score 2:  %  Survey Score 3:  %    Precautions:       Subjective   she is compliant in HEP.  Pain reported as 5/10. left shoulder    Objective            Treatment:  Therapeutic Exercise  TE 1: shoulder flexion table slide for 1 min hold, 3 reps  TE 2: supine shoulder ER stretch using stick for 1 min hold, 3 reps  TE 3: shoulder IR, shoulder ER, and scapular rows using red tb, 3x10 each  TE 4: seated shoulder flexion, 3x10  TE 5: prone shoulder extension, T's, 3x10 each  TE 6: supine bench press, serratus punches, triceps extensions using 3# bar, 3x10  TE 7: supine flies, 3x10  TE 8: sidelying ER, 3x10  TE 9: supine shoulder flexion using 3# bar, 3x10  Manual Therapy  MT 1: OT performed PROM left shoulder flexion, scaption, abduction, adducted ER, and abducted at 90 degree IR for 1 min hold, 3 reps each    Time Entry(in minutes):  Manual Therapy Time Entry: 15  Therapeutic Exercise Time Entry: 40    Assessment & Plan   Assessment: Pt is compliant and independent in HEP. She performed all therapeutic exercises with good form. She demonstrated good strength as evidenced by AROM to shoulder height in shoulder flexion. Pt reported greatest discomfort in shoulder abduction/T's; encouraged  shorter ROM to progress into comfortable motion. Continued mobilizations within comfortable limits while encouraging gentle stretching.  Evaluation/Treatment Tolerance: Patient tolerated treatment well    The patient will continue to benefit from skilled outpatient occupational therapy in order to address the deficits listed in the problem list on the initial evaluation, provide patient and family education, and maximize the patients level of independence in the home and community environments.     The patient's spiritual, cultural, and educational needs were considered, and the patient is agreeable to the plan of care and goals.     Education  Education was done with Patient. The patient's learning style includes Listening and Demonstration. The patient Demonstrates understanding and Verbalizes understanding.                 Plan: progress AROM and strengthening as tolerated    Goals:   Active       long-term       Patient will improve status score on FOTO by 10% to demonstrate an increase in self-perceived functional performance.  (Progressing)       Start:  05/21/25    Expected End:  07/16/25            patient will achieve left shoulder range of motion WFL needed to perform ADL/IADL tasks with enhanced independence. (Progressing)       Start:  05/21/25    Expected End:  07/16/25            patient will achieve LUE strength WFL needed to engage in ADL/IADL tasks  such as putting away groceries. (Progressing)       Start:  05/21/25    Expected End:  07/16/25               short-term       Patient to be independent with home exercise program as issued by Occupational Therapist, noted through return demonstration to increase clinical carry over.  (Met)       Start:  05/21/25    Expected End:  06/18/25    Resolved:  05/23/25         patient will demonstrate 10 degree increase in left shoulder overhead motion needed to engage in ADLs such as grooming, dressing, and bathing with enhanced independence. (Progressing)        Start:  05/21/25    Expected End:  06/18/25            patient will report 3 point reduction in average left shoulder pain to 2/10 or less during functional or therapeutic task performance. (Progressing)       Start:  05/21/25    Expected End:  07/16/25                Jossy Gann, OT

## 2025-05-29 ENCOUNTER — CLINICAL SUPPORT (OUTPATIENT)
Dept: REHABILITATION | Facility: HOSPITAL | Age: 85
End: 2025-05-29
Payer: MEDICARE

## 2025-05-29 DIAGNOSIS — M25.60 DECREASED RANGE OF MOTION WITH DECREASED STRENGTH: Primary | ICD-10-CM

## 2025-05-29 DIAGNOSIS — R53.1 DECREASED RANGE OF MOTION WITH DECREASED STRENGTH: Primary | ICD-10-CM

## 2025-05-29 DIAGNOSIS — R52 PAIN AGGRAVATED BY ACTIVITIES OF DAILY LIVING: ICD-10-CM

## 2025-05-29 PROCEDURE — 97140 MANUAL THERAPY 1/> REGIONS: CPT | Mod: PN

## 2025-05-29 PROCEDURE — 97110 THERAPEUTIC EXERCISES: CPT | Mod: PN

## 2025-05-29 NOTE — PROGRESS NOTES
"  Outpatient Rehab    Occupational Therapy Visit    Patient Name: Stacie Hudson  MRN: [id number]  YOB: 1940  Encounter Date: 5/29/2025    Therapy Diagnosis:   Encounter Diagnoses   Name Primary?    Decreased range of motion with decreased strength Yes    Pain aggravated by activities of daily living      Physician: Reynaldo Chavarria MD    Physician Orders: Eval and Treat  Medical Diagnosis: Left shoulder pain    Visit # / Visits Authorized: 2 / 10  Insurance Authorization Period: 5/22/2025 to 12/31/2025  Date of Evaluation: 5/21/2025  Plan of Care Certification: 5/21/2025 to 8/21/2025      Time In: 1236   Time Out: 1329  Total Time (in minutes): 53   Total Billable Time (in minutes): 53    FOTO:  Intake Score:  %  Survey Score 2:  %  Survey Score 3:  %    Precautions:       Subjective   "I'm doing good.".  Pain reported as 5/10. left shoulder    Objective            Treatment:  Therapeutic Exercise  TE 1: pulleys to tolerance in shoulder flexion for 3 min, shoulder abduction for 3 min  TE 2: biceps curls using 4# bar, 3x10  TE 3: shoulder IR, shoulder ER, and scapular rows using red tb, 3x10 each  TE 4: seated shoulder flexion using 1#, 3x10; seated scaption using 1# for 1x10, then 2x10 no weight  TE 5: prone shoulder extension using 1#, T's, 3x10 each  TE 6: supine bench press, serratus punches, triceps extensions using 4# bar, 3x10  TE 7: supine flies, 3x10  TE 8: sidelying ER, 3x10  TE 9: supine shoulder flexion using 4# bar, 3x10  Manual Therapy  MT 1: OT performed PROM left shoulder flexion, scaption, abduction, adducted ER, and abducted at 90 degree IR for 1 min hold, 3 reps each    Time Entry(in minutes):  Manual Therapy Time Entry: 15  Therapeutic Exercise Time Entry: 38    Assessment & Plan   Assessment: Initiated pulleys to tolerance to enhance joint mobility with positive report. Pt performed all therapeutic exercises well with good form. Increased weighted resistance by 1# with progressing " strength in some exercises; continued AROM in weaker positions. Continued mobilizations to enhance joint mobility with positive report.  Evaluation/Treatment Tolerance: Patient tolerated treatment well    The patient will continue to benefit from skilled outpatient occupational therapy in order to address the deficits listed in the problem list on the initial evaluation, provide patient and family education, and maximize the patients level of independence in the home and community environments.     The patient's spiritual, cultural, and educational needs were considered, and the patient is agreeable to the plan of care and goals.     Education  Education was done with Patient. The patient's learning style includes Listening and Demonstration. The patient Demonstrates understanding and Verbalizes understanding.                 Plan: progress AROM and strengthening as tolerated    Goals:   Active       long-term       Patient will improve status score on FOTO by 10% to demonstrate an increase in self-perceived functional performance.  (Progressing)       Start:  05/21/25    Expected End:  07/16/25            patient will achieve left shoulder range of motion WFL needed to perform ADL/IADL tasks with enhanced independence. (Progressing)       Start:  05/21/25    Expected End:  07/16/25            patient will achieve LUE strength WFL needed to engage in ADL/IADL tasks  such as putting away groceries. (Progressing)       Start:  05/21/25    Expected End:  07/16/25               short-term       Patient to be independent with home exercise program as issued by Occupational Therapist, noted through return demonstration to increase clinical carry over.  (Met)       Start:  05/21/25    Expected End:  06/18/25    Resolved:  05/23/25         patient will demonstrate 10 degree increase in left shoulder overhead motion needed to engage in ADLs such as grooming, dressing, and bathing with enhanced independence. (Progressing)        Start:  05/21/25    Expected End:  06/18/25            patient will report 3 point reduction in average left shoulder pain to 2/10 or less during functional or therapeutic task performance. (Progressing)       Start:  05/21/25    Expected End:  07/16/25                Jossy Gann, OT

## 2025-06-06 ENCOUNTER — CLINICAL SUPPORT (OUTPATIENT)
Dept: REHABILITATION | Facility: HOSPITAL | Age: 85
End: 2025-06-06
Payer: MEDICARE

## 2025-06-06 DIAGNOSIS — R52 PAIN AGGRAVATED BY ACTIVITIES OF DAILY LIVING: ICD-10-CM

## 2025-06-06 DIAGNOSIS — M25.60 DECREASED RANGE OF MOTION WITH DECREASED STRENGTH: Primary | ICD-10-CM

## 2025-06-06 DIAGNOSIS — R53.1 DECREASED RANGE OF MOTION WITH DECREASED STRENGTH: Primary | ICD-10-CM

## 2025-06-06 PROCEDURE — 97110 THERAPEUTIC EXERCISES: CPT | Mod: PN

## 2025-06-06 PROCEDURE — 97140 MANUAL THERAPY 1/> REGIONS: CPT | Mod: PN

## 2025-06-10 ENCOUNTER — CLINICAL SUPPORT (OUTPATIENT)
Dept: REHABILITATION | Facility: HOSPITAL | Age: 85
End: 2025-06-10
Payer: MEDICARE

## 2025-06-10 DIAGNOSIS — R52 PAIN AGGRAVATED BY ACTIVITIES OF DAILY LIVING: ICD-10-CM

## 2025-06-10 DIAGNOSIS — M25.60 DECREASED RANGE OF MOTION WITH DECREASED STRENGTH: Primary | ICD-10-CM

## 2025-06-10 DIAGNOSIS — R53.1 DECREASED RANGE OF MOTION WITH DECREASED STRENGTH: Primary | ICD-10-CM

## 2025-06-10 PROCEDURE — 97140 MANUAL THERAPY 1/> REGIONS: CPT | Mod: PN

## 2025-06-10 PROCEDURE — 97110 THERAPEUTIC EXERCISES: CPT | Mod: PN

## 2025-06-10 NOTE — PROGRESS NOTES
"  Outpatient Rehab    Occupational Therapy Visit    Patient Name: Stacie Hudson  MRN: [id number]  YOB: 1940  Encounter Date: 6/10/2025    Therapy Diagnosis:   Encounter Diagnoses   Name Primary?    Decreased range of motion with decreased strength Yes    Pain aggravated by activities of daily living      Physician: Reynaldo Chavarria MD    Physician Orders: Eval and Treat  Medical Diagnosis: Left shoulder pain  Surgical Diagnosis: left RTS ORIF   Surgical Date: 4/7/2025    Visit # / Visits Authorized: 4 / 10  Insurance Authorization Period: 5/22/2025 to 12/31/2025  Date of Evaluation: 5/21/2025  Plan of Care Certification: 5/21/2025 to 8/21/2025      Time In: 1330   Time Out: 1424  Total Time (in minutes): 54   Total Billable Time (in minutes): 54    FOTO:  Intake Score:  %  Survey Score 2: 65%  Survey Score 3:  %    Precautions:       Subjective   "It's feeling really good." left shoulder.  Pain reported as 0/10. left shoulder    Objective            Treatment:  Therapeutic Exercise  TE 1: pulleys to tolerance in shoulder flexion for 3 min; UBE at level 1 for 5 min  TE 2: biceps curls using 4# bar, 3x10  TE 3: shoulder IR, shoulder ER, and scapular rows using green tb, 3x10 each  TE 4: seated shoulder flexion using 1#, 3x10; seated scaption using 1# for 1x10, then 3x10  TE 5: prone shoulder extension using 2#, T's, 3x10 each  TE 6: supine bench press, serratus punches, triceps extensions using 4# bar, 3x10  TE 7: supine flies, 3x10  TE 8: sidelying ER, 3x10  TE 9: supine shoulder flexion using 4# bar, 3x10  TE 10: AROM shoulder abduction with support of UE ranger, 3x10  Manual Therapy  MT 1: OT performed PROM left shoulder flexion, scaption, abduction, adducted ER, and abducted at 90 degree IR for 1 min hold, 3 reps each    Time Entry(in minutes):  Manual Therapy Time Entry: 15  Therapeutic Exercise Time Entry: 39    Assessment & Plan   Assessment: Pt had no report of shoulder pain upon arrival. She " noted that she feels stronger as she is relying less on her right upper extremity and doing more with her left upper extremity on a daily basis, saying upper body dressing has gotten easier. Pt demonstrated progressing strength as she increased theraband resistance. She performed all therapeutic exercises well with good form. Introduced UBE to progress muscular endurance with reciprocal pattern. No pain reported in session. Continued mobilizations to enhance joint mobility with positive report.  Evaluation/Treatment Tolerance: Patient tolerated treatment well    The patient will continue to benefit from skilled outpatient occupational therapy in order to address the deficits listed in the problem list on the initial evaluation, provide patient and family education, and maximize the patients level of independence in the home and community environments.     The patient's spiritual, cultural, and educational needs were considered, and the patient is agreeable to the plan of care and goals.     Education  Education was done with Patient. The patient's learning style includes Listening and Demonstration. The patient Demonstrates understanding and Verbalizes understanding.                 Plan: progress resistance as tolerated    Goals:   Active       long-term       Patient will improve status score on FOTO by 10% to demonstrate an increase in self-perceived functional performance.  (Met)       Start:  05/21/25    Expected End:  07/16/25    Resolved:  06/10/25         patient will achieve left shoulder range of motion WFL needed to perform ADL/IADL tasks with enhanced independence. (Progressing)       Start:  05/21/25    Expected End:  07/16/25            patient will achieve LUE strength WFL needed to engage in ADL/IADL tasks  such as putting away groceries. (Progressing)       Start:  05/21/25    Expected End:  07/16/25               short-term       Patient to be independent with home exercise program as issued by  Occupational Therapist, noted through return demonstration to increase clinical carry over.  (Met)       Start:  05/21/25    Expected End:  06/18/25    Resolved:  05/23/25         patient will demonstrate 10 degree increase in left shoulder overhead motion needed to engage in ADLs such as grooming, dressing, and bathing with enhanced independence. (Progressing)       Start:  05/21/25    Expected End:  06/18/25            patient will report 3 point reduction in average left shoulder pain to 2/10 or less during functional or therapeutic task performance. (Progressing)       Start:  05/21/25    Expected End:  07/16/25                Jossy aGnn, OT

## 2025-06-13 ENCOUNTER — CLINICAL SUPPORT (OUTPATIENT)
Dept: REHABILITATION | Facility: HOSPITAL | Age: 85
End: 2025-06-13
Payer: MEDICARE

## 2025-06-13 DIAGNOSIS — M25.60 DECREASED RANGE OF MOTION WITH DECREASED STRENGTH: Primary | ICD-10-CM

## 2025-06-13 DIAGNOSIS — R53.1 DECREASED RANGE OF MOTION WITH DECREASED STRENGTH: Primary | ICD-10-CM

## 2025-06-13 DIAGNOSIS — R52 PAIN AGGRAVATED BY ACTIVITIES OF DAILY LIVING: ICD-10-CM

## 2025-06-13 PROCEDURE — 97110 THERAPEUTIC EXERCISES: CPT | Mod: PN

## 2025-06-13 PROCEDURE — 97140 MANUAL THERAPY 1/> REGIONS: CPT | Mod: PN

## 2025-06-13 NOTE — PROGRESS NOTES
"  Outpatient Rehab    Occupational Therapy Visit    Patient Name: Stacie Hudson  MRN: [id number]  YOB: 1940  Encounter Date: 6/13/2025    Therapy Diagnosis:   Encounter Diagnoses   Name Primary?    Decreased range of motion with decreased strength Yes    Pain aggravated by activities of daily living      Physician: Reynaldo Chavarria MD    Physician Orders: Eval and Treat  Medical Diagnosis: Left shoulder pain  Surgical Diagnosis: left RTS ORIF   Surgical Date: 4/7/2025    Visit # / Visits Authorized: 5 / 10  Insurance Authorization Period: 5/22/2025 to 12/31/2025  Date of Evaluation: 5/21/2025  Plan of Care Certification: 5/21/2025 to 8/21/2025      Time In: 1328   Time Out: 1425  Total Time (in minutes): 57   Total Billable Time (in minutes): 57    FOTO:  Intake Score:  %  Survey Score 2:  %  Survey Score 3:  %    Precautions:       Subjective   "I'm doing great.".  Pain reported as 0/10. left shoulder    Objective            Treatment:  Therapeutic Exercise  TE 1: pulleys to tolerance in shoulder flexion for 3 min; UBE at level 1 for 5 min  TE 2: biceps curls using 4# bar, 3x10  TE 3: shoulder IR, shoulder ER, and scapular rows using green tb, 3x10 each  TE 4: seated shoulder flexion using 1#, 3x10; seated scaption using 1# for 3x10  TE 5: prone shoulder extension using 2#, T's, 3x10 each  TE 6: supine bench press, serratus punches, triceps extensions using 4# bar, 3x10  TE 7: supine flies using yellow tb, 3x10  TE 8: sidelying ER, 3x10  TE 9: supine shoulder flexion using 4# bar, 3x10  TE 10: rainbow arc to promote overhead shoulder ROM from R to L and L to R, 20 clips each  Manual Therapy  MT 1: OT performed PROM left shoulder flexion, scaption, abduction, adducted ER, and abducted at 90 degree IR for 1 min hold, 3 reps each    Time Entry(in minutes):  Manual Therapy Time Entry: 15  Therapeutic Exercise Time Entry: 42    Assessment & Plan   Assessment: Pt reported no left shoulder pain. Pt performed " all therapeutic exercises well with good form. She is progressing to comfortable active range of motion, and with complete discharge of sling, she is expected to progress motion more rapidly with greater use in daily tasks. Introduced rainbow arc to challenge overhead motion. Continued mobilizations to progress PROM with positive response.  Evaluation/Treatment Tolerance: Patient tolerated treatment well    The patient will continue to benefit from skilled outpatient occupational therapy in order to address the deficits listed in the problem list on the initial evaluation, provide patient and family education, and maximize the patients level of independence in the home and community environments.     The patient's spiritual, cultural, and educational needs were considered, and the patient is agreeable to the plan of care and goals.     Education  Education was done with Patient. The patient's learning style includes Listening and Demonstration. The patient Demonstrates understanding and Verbalizes understanding.                 Plan: introduce rainbow arc with wrist weight    Goals:   Active       long-term       Patient will improve status score on FOTO by 10% to demonstrate an increase in self-perceived functional performance.  (Met)       Start:  05/21/25    Expected End:  07/16/25    Resolved:  06/10/25         patient will achieve left shoulder range of motion WFL needed to perform ADL/IADL tasks with enhanced independence. (Progressing)       Start:  05/21/25    Expected End:  07/16/25            patient will achieve LUE strength WFL needed to engage in ADL/IADL tasks  such as putting away groceries. (Progressing)       Start:  05/21/25    Expected End:  07/16/25               short-term       Patient to be independent with home exercise program as issued by Occupational Therapist, noted through return demonstration to increase clinical carry over.  (Met)       Start:  05/21/25    Expected End:  06/18/25     Resolved:  05/23/25         patient will demonstrate 10 degree increase in left shoulder overhead motion needed to engage in ADLs such as grooming, dressing, and bathing with enhanced independence. (Progressing)       Start:  05/21/25    Expected End:  06/18/25            patient will report 3 point reduction in average left shoulder pain to 2/10 or less during functional or therapeutic task performance. (Progressing)       Start:  05/21/25    Expected End:  07/16/25                Jossy Gann, OT

## 2025-06-17 ENCOUNTER — CLINICAL SUPPORT (OUTPATIENT)
Dept: REHABILITATION | Facility: HOSPITAL | Age: 85
End: 2025-06-17
Payer: MEDICARE

## 2025-06-17 DIAGNOSIS — R53.1 DECREASED RANGE OF MOTION WITH DECREASED STRENGTH: Primary | ICD-10-CM

## 2025-06-17 DIAGNOSIS — M25.60 DECREASED RANGE OF MOTION WITH DECREASED STRENGTH: Primary | ICD-10-CM

## 2025-06-17 DIAGNOSIS — R52 PAIN AGGRAVATED BY ACTIVITIES OF DAILY LIVING: ICD-10-CM

## 2025-06-17 PROCEDURE — 97140 MANUAL THERAPY 1/> REGIONS: CPT | Mod: PN

## 2025-06-17 PROCEDURE — 97110 THERAPEUTIC EXERCISES: CPT | Mod: PN

## 2025-06-17 NOTE — PROGRESS NOTES
Outpatient Rehab    Occupational Therapy Visit    Patient Name: Stacie Hudson  MRN: [id number]  YOB: 1940  Encounter Date: 6/17/2025    Therapy Diagnosis:   Encounter Diagnoses   Name Primary?    Decreased range of motion with decreased strength Yes    Pain aggravated by activities of daily living      Physician: Reynaldo Chavarria MD    Physician Orders: Eval and Treat  Medical Diagnosis: Left shoulder pain  Surgical Diagnosis: left RTS ORIF   Surgical Date: 4/7/2025  Days Since Last Surgery: 71    Visit # / Visits Authorized: 6 / 10  Insurance Authorization Period: 5/22/2025 to 12/31/2025  Date of Evaluation: 5/21/2025  Plan of Care Certification: 5/21/2025 to 8/21/2025      Time In: 1229   Time Out: 1325  Total Time (in minutes): 56   Total Billable Time (in minutes): 56    FOTO:  Intake Score:  %  Survey Score 2:  %  Survey Score 3:  %    Precautions:       Subjective   Pt reported her shoulder feels a little sore with the weather..  Pain reported as 1/10. left shoulder    Objective            Treatment:  Therapeutic Exercise  TE 1: pulleys to tolerance in shoulder flexion for 3 min; UBE at level 2 for 5 min  TE 2: biceps curls using 5# bar, 3x10  TE 3: shoulder IR, shoulder ER, and scapular rows using blue tb, 3x10 each; blue tb pull aparts 1x15  TE 4: seated shoulder flexion using 2#, 3x10; seated scaption using 2# for 3x10  TE 5: prone shoulder extension using 3#, T's using 2#, 3x10 each  TE 6: supine bench press, serratus punches, triceps extensions using 5# bar, 3x10  TE 7: supine flies using yellow tb, 3x10  TE 8: sidelying ER, 3x10  TE 9: supine shoulder flexion using 5# bar, 3x10  TE 10: rainbow arc to promote overhead shoulder ROM from R to L and L to R, 20 clips each wearing 1# wrist weight  Manual Therapy  MT 1: OT performed PROM left shoulder flexion, scaption, abduction, adducted ER, and abducted at 90 degree IR for 1 min hold, 3 reps each    Time Entry(in minutes):  Manual Therapy  Time Entry: 15  Therapeutic Exercise Time Entry: 41    Assessment & Plan   Assessment: Patient performed all therapeutic exercises well with good form. As she demonstrated progressing strength, OT increased UBE, theraband, and weighted resistance by 1#. Patient able to perform T's with weighted resistance for the first time on this date. No report of pain in exercises. Continued mobilizations to enhance joint mobility with positive report of greater overhead motion.   Evaluation/Treatment Tolerance: Patient tolerated treatment well    The patient will continue to benefit from skilled outpatient occupational therapy in order to address the deficits listed in the problem list on the initial evaluation, provide patient and family education, and maximize the patients level of independence in the home and community environments.     The patient's spiritual, cultural, and educational needs were considered, and the patient is agreeable to the plan of care and goals.     Education  Education was done with Patient. The patient's learning style includes Listening and Demonstration. The patient Demonstrates understanding and Verbalizes understanding.                 Plan: progress note next session    Goals:   Active       long-term       Patient will improve status score on FOTO by 10% to demonstrate an increase in self-perceived functional performance.  (Met)       Start:  05/21/25    Expected End:  07/16/25    Resolved:  06/10/25         patient will achieve left shoulder range of motion WFL needed to perform ADL/IADL tasks with enhanced independence. (Progressing)       Start:  05/21/25    Expected End:  07/16/25            patient will achieve LUE strength WFL needed to engage in ADL/IADL tasks  such as putting away groceries. (Progressing)       Start:  05/21/25    Expected End:  07/16/25               short-term       Patient to be independent with home exercise program as issued by Occupational Therapist, noted  through return demonstration to increase clinical carry over.  (Met)       Start:  05/21/25    Expected End:  06/18/25    Resolved:  05/23/25         patient will demonstrate 10 degree increase in left shoulder overhead motion needed to engage in ADLs such as grooming, dressing, and bathing with enhanced independence. (Progressing)       Start:  05/21/25    Expected End:  06/18/25            patient will report 3 point reduction in average left shoulder pain to 2/10 or less during functional or therapeutic task performance. (Progressing)       Start:  05/21/25    Expected End:  07/16/25                Jossy Gann, OT

## 2025-06-19 ENCOUNTER — CLINICAL SUPPORT (OUTPATIENT)
Dept: REHABILITATION | Facility: HOSPITAL | Age: 85
End: 2025-06-19
Payer: MEDICARE

## 2025-06-19 DIAGNOSIS — R53.1 DECREASED RANGE OF MOTION WITH DECREASED STRENGTH: Primary | ICD-10-CM

## 2025-06-19 DIAGNOSIS — R52 PAIN AGGRAVATED BY ACTIVITIES OF DAILY LIVING: ICD-10-CM

## 2025-06-19 DIAGNOSIS — M25.60 DECREASED RANGE OF MOTION WITH DECREASED STRENGTH: Primary | ICD-10-CM

## 2025-06-19 PROCEDURE — 97140 MANUAL THERAPY 1/> REGIONS: CPT | Mod: PN

## 2025-06-19 PROCEDURE — 97110 THERAPEUTIC EXERCISES: CPT | Mod: PN

## 2025-06-19 NOTE — PROGRESS NOTES
"  Outpatient Rehab    Occupational Therapy Progress Note    Patient Name: Stacie Hudson  MRN: [id number]  YOB: 1940  Encounter Date: 6/19/2025    Therapy Diagnosis:   Encounter Diagnoses   Name Primary?    Decreased range of motion with decreased strength Yes    Pain aggravated by activities of daily living      Physician: Reynaldo Chavarria MD    Physician Orders: Eval and Treat  Medical Diagnosis: Left shoulder pain  Surgical Diagnosis: left RTS ORIF   Surgical Date: 4/7/2025  Days Since Last Surgery: 73    Visit # / Visits Authorized: 7 / 10  Insurance Authorization Period: 5/22/2025 to 12/31/2025  Date of Evaluation: 5/21/2025  Plan of Care Certification: 5/21/2025 to 8/21/2025      Time In: 1232   Time Out: 1328  Total Time (in minutes): 56   Total Billable Time (in minutes): 56    FOTO:  Intake Score:  %  Survey Score 2:  %  Survey Score 3:  %    Precautions:       Subjective   "I'm feeling good.".  Pain reported as 0/10. left shoulder    Objective      Shoulder Range of Motion  Left Shoulder   Active (deg) Passive (deg) Pain   Flexion 115       Extension 60       Scaption         ABduction 120       ADduction         Horizontal ABduction         Horizontal ADduction         External Rotation (Shoulder ABducted 0 degrees) 40       External Rotation (Shoulder ABducted 45 degrees)         External Rotation (Shoulder ABducted 90 degrees)         Internal Rotation (Shoulder ABducted 0 degrees)  (T12)       Internal Rotation (Shoulder ABducted 45 degrees)         Internal Rotation (Shoulder ABducted 90 degrees)                       Shoulder Strength - Planes of Motion   Right Strength Right Pain Left Strength Left  Pain   Flexion 4+   4     Extension 4+   4     ABduction 4+   4     ADduction 4+   4     Horizontal ABduction 4+   4     Horizontal ADduction 4+   4     Internal Rotation 0° 4+   4     Internal Rotation 90° 4+   4     External Rotation 0° 4+   4     External Rotation 90° 4+   4               "      COMPARED TO EVALUATION     Shoulder Range of Motion  Right Shoulder    Active (deg) Passive (deg) Pain   Flexion 150       Extension 60       Scaption         ABduction 120       ADduction         Horizontal ABduction         Horizontal ADduction         External Rotation (Shoulder ABducted 0 degrees) 40       External Rotation (Shoulder ABducted 45 degrees)         External Rotation (Shoulder ABducted 90 degrees)         Internal Rotation (Shoulder ABducted 0 degrees)         Internal Rotation (Shoulder ABducted 45 degrees)         Internal Rotation (Shoulder ABducted 90 degrees)            Left Shoulder    Active (deg) Passive (deg) Pain   Flexion 90   Yes   Extension 50       Scaption         ABduction 90   Yes   ADduction         Horizontal ABduction         Horizontal ADduction         External Rotation (Shoulder ABducted 0 degrees) 25       External Rotation (Shoulder ABducted 45 degrees)         External Rotation (Shoulder ABducted 90 degrees)         Internal Rotation (Shoulder ABducted 0 degrees)         Internal Rotation (Shoulder ABducted 45 degrees)         Internal Rotation (Shoulder ABducted 90 degrees)                     Elbow/Forearm Range of Motion   Left Elbow/Forearm    Active (deg) Passive (deg) Pain   Flexion  (WNL)       Extension  (WNL)       Forearm Pronation         Forearm Supination                           Shoulder Strength - Planes of Motion    Right Strength Right Pain Left Strength Left  Pain   Flexion 4+         Extension 4+         ABduction 4+         ADduction 4+         Horizontal ABduction 4+         Horizontal ADduction 4+         Internal Rotation 0° 4+         Internal Rotation 90° 4+         External Rotation 0° 4+         External Rotation 90° 4+               Shoulder Strength Details  Left not tested secondary to postoperative status     Elbow Strength    Right Strength Right Pain Left Strength Left  Pain   Flexion (C6) 4+   4+     Extension (C7) 4+   4+            Forearm Strength    Right Strength Right Pain Left Strength Left  Pain   Pronation 4+   4+     Supination 4+   4+           Right  Strength  Right Hand Dynamometer Position: 2  Elbow Position Forearm Position Trial 1 (lbs) Trial 2  (lbs) Trial 3  (lbs) Average  (lbs) Pain   Flexed Neutral 25 30 35 30           Left  Strength  Left Hand Dynamometer Position: 2  Elbow Position Forearm Position Trial 1 (lbs) Trial 2 (lbs) Trial 3 (lbs) Average (lbs) Pain   Flexed Neutral 23 24 25 24        Treatment:  Therapeutic Exercise  TE 1: pulleys to tolerance in shoulder flexion for 3 min; UBE at level 3 for 5 min  TE 2: biceps curls using 5# bar, 3x10  TE 3: shoulder IR, shoulder ER, and scapular rows using blue tb, 3x10 each; blue tb pull aparts 1x15  TE 4: seated shoulder flexion using 2#, 3x10; seated scaption using 2# for 3x10  TE 5: prone shoulder extension using 3#, T's using 2#, 3x10 each  TE 6: supine bench press, serratus punches, triceps extensions using 5# bar, 3x10  TE 7: supine flies using yellow tb, 3x10  TE 8: sidelying ER, 3x10  TE 9: supine shoulder flexion using 5# bar, 3x10  TE 10: rainbow arc to promote overhead shoulder ROM from R to L and L to R, 20 clips each wearing 1# wrist weight  Manual Therapy  MT 1: OT performed PROM left shoulder flexion, scaption, abduction, adducted ER, and abducted at 90 degree IR for 1 min hold, 3 reps each    Time Entry(in minutes):  Manual Therapy Time Entry: 15  Therapeutic Exercise Time Entry: 41    Assessment & Plan   Assessment: Patient performed all therapeutic exercises well with good form. Increased weighted resistance in rainbow arc with positive report of pain-free motion. Upon reassessment of range of motion, she is progressing well to achieve functional range of motion and strength. She remains relatively pain-free with occasional report of muscular soreness after continuous engagement in IADLs such as meal prep or driving. Will continue to progress  strengthening and passive range of motion as tolerated.  Evaluation/Treatment Tolerance: Patient tolerated treatment well    The patient will continue to benefit from skilled outpatient occupational therapy in order to address the deficits listed in the problem list on the initial evaluation, provide patient and family education, and maximize the patients level of independence in the home and community environments.     The patient's spiritual, cultural, and educational needs were considered, and the patient is agreeable to the plan of care and goals.     Education  Education was done with Patient. The patient's learning style includes Listening and Demonstration. The patient Demonstrates understanding and Verbalizes understanding.                 Plan: add wall slides and progress strengthening    Goals:   Active       long-term       Patient will improve status score on FOTO by 10% to demonstrate an increase in self-perceived functional performance.  (Met)       Start:  05/21/25    Expected End:  07/16/25    Resolved:  06/10/25         patient will achieve left shoulder range of motion WFL needed to perform ADL/IADL tasks with enhanced independence. (Progressing)       Start:  05/21/25    Expected End:  07/16/25            patient will achieve LUE strength WFL needed to engage in ADL/IADL tasks  such as putting away groceries. (Progressing)       Start:  05/21/25    Expected End:  07/16/25              Resolved       short-term       Patient to be independent with home exercise program as issued by Occupational Therapist, noted through return demonstration to increase clinical carry over.  (Met)       Start:  05/21/25    Expected End:  06/18/25    Resolved:  05/23/25         patient will demonstrate 10 degree increase in left shoulder overhead motion needed to engage in ADLs such as grooming, dressing, and bathing with enhanced independence. (Met)       Start:  05/21/25    Expected End:  06/18/25    Resolved:   06/19/25         patient will report 3 point reduction in average left shoulder pain to 2/10 or less during functional or therapeutic task performance. (Met)       Start:  05/21/25    Expected End:  07/16/25    Resolved:  06/19/25             Jossy Gann, OT

## 2025-06-23 ENCOUNTER — OFFICE VISIT (OUTPATIENT)
Dept: FAMILY MEDICINE | Facility: CLINIC | Age: 85
End: 2025-06-23
Payer: MEDICARE

## 2025-06-23 VITALS
DIASTOLIC BLOOD PRESSURE: 64 MMHG | BODY MASS INDEX: 18.83 KG/M2 | HEIGHT: 65 IN | SYSTOLIC BLOOD PRESSURE: 102 MMHG | OXYGEN SATURATION: 98 % | RESPIRATION RATE: 16 BRPM | HEART RATE: 77 BPM | WEIGHT: 113 LBS

## 2025-06-23 DIAGNOSIS — Z78.0 POSTMENOPAUSAL: ICD-10-CM

## 2025-06-23 DIAGNOSIS — E11.9 TYPE 2 DIABETES MELLITUS WITHOUT COMPLICATION, WITHOUT LONG-TERM CURRENT USE OF INSULIN: ICD-10-CM

## 2025-06-23 DIAGNOSIS — I49.3 FREQUENT PVCS: ICD-10-CM

## 2025-06-23 DIAGNOSIS — Z13.220 ENCOUNTER FOR LIPID SCREENING FOR CARDIOVASCULAR DISEASE: ICD-10-CM

## 2025-06-23 DIAGNOSIS — N39.0 RECURRENT UTI: Primary | ICD-10-CM

## 2025-06-23 DIAGNOSIS — Z98.890 H/O SHOULDER SURGERY: ICD-10-CM

## 2025-06-23 DIAGNOSIS — Z13.6 ENCOUNTER FOR LIPID SCREENING FOR CARDIOVASCULAR DISEASE: ICD-10-CM

## 2025-06-23 PROCEDURE — 99213 OFFICE O/P EST LOW 20 MIN: CPT | Mod: PBBFAC,PN | Performed by: STUDENT IN AN ORGANIZED HEALTH CARE EDUCATION/TRAINING PROGRAM

## 2025-06-23 PROCEDURE — 99999 PR PBB SHADOW E&M-EST. PATIENT-LVL III: CPT | Mod: PBBFAC,,, | Performed by: STUDENT IN AN ORGANIZED HEALTH CARE EDUCATION/TRAINING PROGRAM

## 2025-06-23 PROCEDURE — 99214 OFFICE O/P EST MOD 30 MIN: CPT | Mod: S$PBB,,, | Performed by: STUDENT IN AN ORGANIZED HEALTH CARE EDUCATION/TRAINING PROGRAM

## 2025-06-23 RX ORDER — METOPROLOL SUCCINATE 50 MG/1
50 TABLET, EXTENDED RELEASE ORAL DAILY
Qty: 90 TABLET | Refills: 3 | Status: SHIPPED | OUTPATIENT
Start: 2025-06-23 | End: 2025-06-24 | Stop reason: SDUPTHER

## 2025-06-23 RX ORDER — NITROFURANTOIN MACROCRYSTALS 50 MG/1
CAPSULE ORAL
Qty: 45 CAPSULE | Refills: 1 | Status: SHIPPED | OUTPATIENT
Start: 2025-06-23 | End: 2025-06-24 | Stop reason: SDUPTHER

## 2025-06-23 NOTE — ASSESSMENT & PLAN NOTE
Lab Results   Component Value Date    HGBA1C 5.7 (H) 03/20/2025     Glucoses well controlled  Diabetes Medications              alogliptin (NESINA) 25 mg Tab Take 1 tablet by mouth once daily.          Stable. Continue current medications and regular followup.

## 2025-06-23 NOTE — PROGRESS NOTES
Subjective:       Patient ID: Stacie Hudson is a 84 y.o. female.    Chief Complaint: Follow-up    History of Present Illness    CHIEF COMPLAINT:  Ms. Hudson presents today for post-operative follow up of shoulder surgery.    POST-OPERATIVE SHOULDER STATUS:  She reports significant improvement in shoulder mobility with ability to elevate arm. She has completed approximately 5 physical therapy sessions and receives wave therapy on weekends with positive response. She continues pain medication and notes movement is beneficial. She is motivated and satisfied with rehabilitation progress.    DIABETES:  She reports well-controlled diabetes with blood sugar of 98 on the day of surgery and current A1C of 5.7. She manages diabetes with oral medications. Family history significant for two sons with insulin-dependent diabetes.    MEDICATIONS:  She continues Metoprolol 50 mg with one refill remaining. She reports issues with nitrofurazone prescription renewal at pharmacy.    PREVENTIVE CARE:  She missed scheduled bone density scan originally planned for April 1st.       Review of Systems   Constitutional:  Negative for activity change and appetite change.   Respiratory:  Negative for shortness of breath.    Cardiovascular:  Negative for chest pain.   Gastrointestinal:  Negative for abdominal pain.   Genitourinary:  Negative for dysuria.   Integumentary:  Negative for rash.   Psychiatric/Behavioral:  Negative for depressed mood and sleep disturbance. The patient is not nervous/anxious.       Problem List[1]  Stacie has a current medication list which includes the following prescription(s): alogliptin, famotidine, gabapentin, rosuvastatin, vit c/e/zn/coppr/lutein/zeaxan, metoprolol succinate, and nitrofurantoin.        Health Maintenance Due   Topic Date Due    DEXA Scan  Never done      Health Maintenance reviewed and discussed- dexa ordered.     Objective:      Physical Exam  Constitutional:       General: She is not in acute  distress.     Appearance: Normal appearance. She is not ill-appearing.   Eyes:      Conjunctiva/sclera: Conjunctivae normal.   Cardiovascular:      Rate and Rhythm: Normal rate and regular rhythm.      Heart sounds: Normal heart sounds. No murmur heard.  Pulmonary:      Effort: Pulmonary effort is normal. No respiratory distress.      Breath sounds: Normal breath sounds. No wheezing or rales.   Musculoskeletal:      Right lower leg: No edema.      Left lower leg: No edema.   Lymphadenopathy:      Cervical: No cervical adenopathy.   Skin:     General: Skin is warm and dry.   Neurological:      Mental Status: She is alert. Mental status is at baseline.      Gait: Gait normal.   Psychiatric:         Mood and Affect: Mood normal.         Behavior: Behavior normal.         Thought Content: Thought content normal.         Judgment: Judgment normal.                 Assessment:       Assessment & Plan    1. Assessed post-op wound healing, noting good progress.  2. Blood sugar control excellent with A1C of 5.7.  3. BP WNL.  4. Considered DEXA scan, acknowledging delay due to patient's recent injury.           Plan:       1. Recurrent UTI  Overview:  Previous cultures  2020- E coli resistant to ampicillin, cipro, macrobid, bactrim.  Sensitive to augmentin, cefepime,       Assessment & Plan:  On every other day treatment with macrobid for recurrent uti.  Preventative therapy    Orders:  -     nitrofurantoin (MACRODANTIN) 50 MG capsule; Take one by mouth every other day for recurrent uti  Dispense: 45 capsule; Refill: 1    2. Frequent PVCs  -     metoprolol succinate (TOPROL-XL) 50 MG 24 hr tablet; Take 1 tablet (50 mg total) by mouth once daily.  Dispense: 90 tablet; Refill: 3    3. Type 2 diabetes mellitus without complication, without long-term current use of insulin  Assessment & Plan:  Lab Results   Component Value Date    HGBA1C 5.7 (H) 03/20/2025     Glucoses well controlled  Diabetes Medications              alogliptin  (NESINA) 25 mg Tab Take 1 tablet by mouth once daily.          Stable. Continue current medications and regular followup.      Orders:  -     CBC Without Differential; Future; Expected date: 09/23/2025  -     Comprehensive Metabolic Panel; Future; Expected date: 09/23/2025  -     Microalbumin/Creatinine Ratio, Urine; Future; Expected date: 09/23/2025  -     Hemoglobin A1C; Future; Expected date: 09/23/2025  -     TSH; Future; Expected date: 09/23/2025    4. H/O shoulder surgery  Comments:  on the left. doing therapy and doing well. healing well.    5. Postmenopausal  -     DEXA Bone Density Axial Skeleton 1 or more Site W TBS XPD; Future; Expected date: 06/23/2025    6. Encounter for lipid screening for cardiovascular disease  -     Lipid Panel; Future; Expected date: 09/23/2025         This note was generated with the assistance of ambient listening technology. Verbal consent was obtained by the patient and accompanying visitor(s) for the recording of patient appointment to facilitate this note. I attest to having reviewed and edited the generated note for accuracy, though some syntax or spelling errors may persist. Please contact the author of this note for any clarification.                    [1]   Patient Active Problem List  Diagnosis    Recurrent UTI    Dysphagia    Type 2 diabetes mellitus without complication, without long-term current use of insulin    Low serum vitamin D    B12 deficiency    Macular degeneration    Frequent PVCs    Gastroesophageal reflux disease without esophagitis    Neuralgia, postherpetic    Aortic atherosclerosis    Impacted cerumen of both ears    Hearing loss    Ringing in ear, bilateral    Abnormal gait    Decreased range of motion with decreased strength    Pain aggravated by activities of daily living

## 2025-06-24 ENCOUNTER — CLINICAL SUPPORT (OUTPATIENT)
Dept: REHABILITATION | Facility: HOSPITAL | Age: 85
End: 2025-06-24
Payer: MEDICARE

## 2025-06-24 DIAGNOSIS — R53.1 DECREASED RANGE OF MOTION WITH DECREASED STRENGTH: Primary | ICD-10-CM

## 2025-06-24 DIAGNOSIS — M25.60 DECREASED RANGE OF MOTION WITH DECREASED STRENGTH: Primary | ICD-10-CM

## 2025-06-24 DIAGNOSIS — N39.0 RECURRENT UTI: ICD-10-CM

## 2025-06-24 DIAGNOSIS — R52 PAIN AGGRAVATED BY ACTIVITIES OF DAILY LIVING: ICD-10-CM

## 2025-06-24 DIAGNOSIS — I49.3 FREQUENT PVCS: ICD-10-CM

## 2025-06-24 PROCEDURE — 97110 THERAPEUTIC EXERCISES: CPT | Mod: PN

## 2025-06-24 PROCEDURE — 97140 MANUAL THERAPY 1/> REGIONS: CPT | Mod: PN

## 2025-06-24 RX ORDER — NITROFURANTOIN MACROCRYSTALS 50 MG/1
CAPSULE ORAL
Qty: 45 CAPSULE | Refills: 1 | Status: SHIPPED | OUTPATIENT
Start: 2025-06-24

## 2025-06-24 RX ORDER — NITROFURANTOIN MACROCRYSTALS 50 MG/1
CAPSULE ORAL
Qty: 45 CAPSULE | Refills: 1 | Status: CANCELLED | OUTPATIENT
Start: 2025-06-24

## 2025-06-24 RX ORDER — METOPROLOL SUCCINATE 50 MG/1
50 TABLET, EXTENDED RELEASE ORAL DAILY
Qty: 90 TABLET | Refills: 3 | Status: SHIPPED | OUTPATIENT
Start: 2025-06-24

## 2025-06-24 RX ORDER — METOPROLOL SUCCINATE 50 MG/1
50 TABLET, EXTENDED RELEASE ORAL DAILY
Qty: 90 TABLET | Refills: 3 | Status: CANCELLED | OUTPATIENT
Start: 2025-06-24

## 2025-06-24 NOTE — TELEPHONE ENCOUNTER
Care Due:                  Date            Visit Type   Department     Provider  --------------------------------------------------------------------------------                                EP -                              [facility]  Last Visit: 06-      CARE ([facility])   MEDICINE       Shu Smith                               -                              [facility]  Next Visit: 09-      CARE ([facility])   MEDICINE       Shu Smith                                                            Last  Test          Frequency    Reason                     Performed    Due Date  --------------------------------------------------------------------------------    CMP.........  12 months..  alogliptin, famotidine,    09- 09-                             rosuvastatin.............    HBA1C.......  6 months...  alogliptin...............  03- 09-    Lipid Panel.  12 months..  rosuvastatin.............  09- 09-    [facility] Embedded Care Due Messages. Reference number: [id number].   6/24/2025 1:58:47 PM CDT

## 2025-06-24 NOTE — PROGRESS NOTES
Outpatient Rehab    Occupational Therapy Visit    Patient Name: Stacie Hudson  MRN: [id number]  YOB: 1940  Encounter Date: 6/24/2025    Therapy Diagnosis:   Encounter Diagnoses   Name Primary?    Decreased range of motion with decreased strength Yes    Pain aggravated by activities of daily living      Physician: Reynaldo Chavarria MD    Physician Orders: Eval and Treat  Medical Diagnosis: Left shoulder pain  Surgical Diagnosis: left RTS ORIF   Surgical Date: 4/7/2025  Days Since Last Surgery: 78    Visit # / Visits Authorized: 8 / 10  Insurance Authorization Period: 5/22/2025 to 12/31/2025  Date of Evaluation: 5/21/2025  Plan of Care Certification: 5/21/2025 to 8/21/2025      Time In: 1229   Time Out: 1323  Total Time (in minutes): 54   Total Billable Time (in minutes): 54    FOTO:  Intake Score:  %  Survey Score 2:  %  Survey Score 3:  %    Precautions:       Subjective   Pt reported feeling challenged by her balance today, noting history of inner ear problems. Her shoulder feels well..  Pain reported as 0/10. left shoulder    Objective            Treatment:  Therapeutic Exercise  TE 1: pulleys to tolerance in shoulder flexion for 3 min; UBE at level 3 for 5 min  TE 2: biceps curls using 5# bar, 3x10  TE 3: shoulder IR using black tb, shoulder ER using blue tb, and scapular rows using black tb tb, 3x10 each  TE 4: seated shoulder flexion using 2#, 3x10; seated scaption using 2# for 3x10  TE 5: prone shoulder extension using 3#, T's using 2#, 3x10 each  TE 6: supine bench press, serratus punches, triceps extensions using 5# bar, 3x10  TE 9: supine shoulder flexion using 5# bar, 3x10  TE 10: rainbow arc to promote overhead shoulder ROM from R to L and L to R, 2x12 clips each wearing 2# wrist weight; wall slides, 3x10  Manual Therapy  MT 1: OT performed PROM left shoulder flexion, scaption, abduction, adducted ER, and abducted at 90 degree IR for 1 min hold, 3 reps each    Time Entry(in  minutes):  Manual Therapy Time Entry: 15  Therapeutic Exercise Time Entry: 39    Assessment & Plan   Assessment: Patient reported feeling well today, but noted some balance concerns. Monitored throughout session to ensure safety. Patient performed all therapeutic exercises well with good form throughout. Increased weighted resistance and repetitions for rainbow arc, indicating improved strength and muscular endurance. Added wall slides to progress overhead motion with decreased scapular hiking. Patient is very happy with her progress as she feels she can perform more daily tasks with greater ease using her LUE. Continued mobilizations to enhance functional range of motion.  Evaluation/Treatment Tolerance: Patient tolerated treatment well    The patient will continue to benefit from skilled outpatient occupational therapy in order to address the deficits listed in the problem list on the initial evaluation, provide patient and family education, and maximize the patients level of independence in the home and community environments.     The patient's spiritual, cultural, and educational needs were considered, and the patient is agreeable to the plan of care and goals.     Education  Education was done with Patient. The patient's learning style includes Listening and Demonstration. The patient Demonstrates understanding and Verbalizes understanding.                 Plan: progress strengthening as tolerated    Goals:   Active       long-term       Patient will improve status score on FOTO by 10% to demonstrate an increase in self-perceived functional performance.  (Met)       Start:  05/21/25    Expected End:  07/16/25    Resolved:  06/10/25         patient will achieve left shoulder range of motion WFL needed to perform ADL/IADL tasks with enhanced independence. (Progressing)       Start:  05/21/25    Expected End:  07/16/25            patient will achieve LUE strength WFL needed to engage in ADL/IADL tasks  such as  putting away groceries. (Progressing)       Start:  05/21/25    Expected End:  07/16/25              Resolved       short-term       Patient to be independent with home exercise program as issued by Occupational Therapist, noted through return demonstration to increase clinical carry over.  (Met)       Start:  05/21/25    Expected End:  06/18/25    Resolved:  05/23/25         patient will demonstrate 10 degree increase in left shoulder overhead motion needed to engage in ADLs such as grooming, dressing, and bathing with enhanced independence. (Met)       Start:  05/21/25    Expected End:  06/18/25    Resolved:  06/19/25         patient will report 3 point reduction in average left shoulder pain to 2/10 or less during functional or therapeutic task performance. (Met)       Start:  05/21/25    Expected End:  07/16/25    Resolved:  06/19/25             Jossy Gann, OT

## 2025-06-26 ENCOUNTER — CLINICAL SUPPORT (OUTPATIENT)
Dept: REHABILITATION | Facility: HOSPITAL | Age: 85
End: 2025-06-26
Payer: MEDICARE

## 2025-06-26 DIAGNOSIS — R52 PAIN AGGRAVATED BY ACTIVITIES OF DAILY LIVING: ICD-10-CM

## 2025-06-26 DIAGNOSIS — R53.1 DECREASED RANGE OF MOTION WITH DECREASED STRENGTH: Primary | ICD-10-CM

## 2025-06-26 DIAGNOSIS — M25.60 DECREASED RANGE OF MOTION WITH DECREASED STRENGTH: Primary | ICD-10-CM

## 2025-06-26 PROCEDURE — 97110 THERAPEUTIC EXERCISES: CPT | Mod: PN

## 2025-06-26 PROCEDURE — 97140 MANUAL THERAPY 1/> REGIONS: CPT | Mod: PN

## 2025-06-26 NOTE — PROGRESS NOTES
Outpatient Rehab    Occupational Therapy Visit    Patient Name: Stacie Hudson  MRN: [id number]  YOB: 1940  Encounter Date: 6/26/2025    Therapy Diagnosis:   Encounter Diagnoses   Name Primary?    Decreased range of motion with decreased strength Yes    Pain aggravated by activities of daily living      Physician: Reynaldo Chavarria MD    Physician Orders: Eval and Treat  Medical Diagnosis: Left shoulder pain  Surgical Diagnosis: left RTS ORIF   Surgical Date: 4/7/2025  Days Since Last Surgery: 80    Visit # / Visits Authorized: 9 / 10  Insurance Authorization Period: 5/22/2025 to 12/31/2025  Date of Evaluation: 5/21/2025  Plan of Care Certification: 5/21/2025 to 8/21/2025      Time In: 1230   Time Out: 1324  Total Time (in minutes): 54   Total Billable Time (in minutes): 54    FOTO:  Intake Score:  %  Survey Score 2:  %  Survey Score 3:  %    Precautions:       Subjective   Pt reported feeling well today..  Pain reported as 0/10. left shoulder    Objective            Treatment:  Therapeutic Exercise  TE 1: pulleys to tolerance in shoulder flexion for 3 min; UBE at level 3 for 5 min  TE 2: biceps curls using 5# bar, 3x10  TE 3: shoulder IR using black tb, shoulder ER using blue tb, and scapular rows using black tb tb, 3x10 each  TE 4: seated shoulder flexion using 1#, 3x10; seated scaption using 1# for 3x10  TE 5: prone shoulder extension using 3#, T's using 2#, 3x10 each  TE 6: supine bench press, serratus punches, triceps extensions using 5# bar, 3x10  TE 9: supine shoulder flexion using 1#, 3x10  TE 10: rainbow arc to promote overhead shoulder ROM from R to L and L to R, 2x12 clips each wearing 2# wrist weight; wall slides, 3x10  Manual Therapy  MT 1: OT performed PROM left shoulder flexion, scaption, abduction, adducted ER, and abducted at 90 degree IR for 1 min hold, 3 reps each    Time Entry(in minutes):  Manual Therapy Time Entry: 15  Therapeutic Exercise Time Entry: 39    Assessment & Plan    Assessment: Patient performed all therapeutic exercises well with good form throughout. She had no report of pain using weighted resistance, however, she did request decreased resistance in performance of seated exercises and supine shoulder flexion secondary to muscular fatigue. Patient understands she would benefit from continued strengthening to enhance functional use of LUE as well as increase muscular endurance for task performance. Continued mobilizations with positive report of enhanced mobility following.   Evaluation/Treatment Tolerance: Patient tolerated treatment well    The patient will continue to benefit from skilled outpatient occupational therapy in order to address the deficits listed in the problem list on the initial evaluation, provide patient and family education, and maximize the patients level of independence in the home and community environments.     The patient's spiritual, cultural, and educational needs were considered, and the patient is agreeable to the plan of care and goals.     Education  Education was done with Patient. The patient's learning style includes Listening and Demonstration. The patient Demonstrates understanding and Verbalizes understanding.                 Plan: increase UBE to progress muscular endurance    Goals:   Active       long-term       Patient will improve status score on FOTO by 10% to demonstrate an increase in self-perceived functional performance.  (Met)       Start:  05/21/25    Expected End:  07/16/25    Resolved:  06/10/25         patient will achieve left shoulder range of motion WFL needed to perform ADL/IADL tasks with enhanced independence. (Progressing)       Start:  05/21/25    Expected End:  07/16/25            patient will achieve LUE strength WFL needed to engage in ADL/IADL tasks  such as putting away groceries. (Progressing)       Start:  05/21/25    Expected End:  07/16/25              Resolved       short-term       Patient to be  independent with home exercise program as issued by Occupational Therapist, noted through return demonstration to increase clinical carry over.  (Met)       Start:  05/21/25    Expected End:  06/18/25    Resolved:  05/23/25         patient will demonstrate 10 degree increase in left shoulder overhead motion needed to engage in ADLs such as grooming, dressing, and bathing with enhanced independence. (Met)       Start:  05/21/25    Expected End:  06/18/25    Resolved:  06/19/25         patient will report 3 point reduction in average left shoulder pain to 2/10 or less during functional or therapeutic task performance. (Met)       Start:  05/21/25    Expected End:  07/16/25    Resolved:  06/19/25             Jossy Gann, OT

## 2025-07-11 ENCOUNTER — CLINICAL SUPPORT (OUTPATIENT)
Dept: REHABILITATION | Facility: HOSPITAL | Age: 85
End: 2025-07-11
Payer: MEDICARE

## 2025-07-11 DIAGNOSIS — R52 PAIN AGGRAVATED BY ACTIVITIES OF DAILY LIVING: ICD-10-CM

## 2025-07-11 DIAGNOSIS — R53.1 DECREASED RANGE OF MOTION WITH DECREASED STRENGTH: Primary | ICD-10-CM

## 2025-07-11 DIAGNOSIS — M25.60 DECREASED RANGE OF MOTION WITH DECREASED STRENGTH: Primary | ICD-10-CM

## 2025-07-11 PROCEDURE — 97110 THERAPEUTIC EXERCISES: CPT | Mod: PN

## 2025-07-11 PROCEDURE — 97140 MANUAL THERAPY 1/> REGIONS: CPT | Mod: PN

## 2025-07-11 NOTE — PROGRESS NOTES
Outpatient Rehab    Occupational Therapy Visit    Patient Name: Stacie Hudson  MRN: [id number]  YOB: 1940  Encounter Date: 7/11/2025    Therapy Diagnosis:   Encounter Diagnoses   Name Primary?    Decreased range of motion with decreased strength Yes    Pain aggravated by activities of daily living      Physician: Reynaldo Chavarria MD    Physician Orders: Eval and Treat  Medical Diagnosis: Left shoulder pain  Surgical Diagnosis: left RTS ORIF   Surgical Date: 4/7/2025  Days Since Last Surgery: 95    Visit # / Visits Authorized: 10 / 20  Insurance Authorization Period: 5/22/2025 to 12/31/2025  Date of Evaluation: 5/21/2025  Plan of Care Certification: 5/21/2025 to 8/21/2025      Time In: 1427   Time Out: 1521  Total Time (in minutes): 54   Total Billable Time (in minutes): 54    FOTO:  Intake Score:  %  Survey Score 2:  %  Survey Score 3:  %    Precautions:       Subjective   Pt reported feeling well today. She has been released by her doctor..  Pain reported as 0/10. left shoulder    Objective            Treatment:  Therapeutic Exercise  TE 1: UBE at level 3 for 4 min forward, 4 min backward  TE 2: biceps curls using 3#, 3x10  TE 3: shoulder IR using black tb, shoulder ER using blue tb, and scapular rows using black tb tb, 3x10 each  TE 4: seated shoulder flexion using 1#, 3x10; seated scaption using 1# for 3x10  TE 5: prone shoulder extension using 3#, T's using 2#, 3x10 each  TE 6: supine bench press, serratus punches, triceps extensions using 3#, 3x10  TE 7: supine flies using yellow tb, 3x10  TE 9: supine shoulder flexion using 1#, 3x10  TE 10: rainbow arc to promote overhead shoulder ROM from R to L and L to R, 2x12 clips each wearing 2# wrist weight; wall slides, 3x10  Manual Therapy  MT 1: OT performed PROM left shoulder flexion, scaption, abduction, adducted ER, and abducted at 90 degree IR for 1 min hold, 3 reps each    Time Entry(in minutes):  Manual Therapy Time Entry: 15  Therapeutic  Exercise Time Entry: 39    Assessment & Plan   Assessment: Patient reported noticing her functional range of motion has improved, but she still feels weak in her LUE. Patient progressed muscular endurance on UBE with prolonged timeframe. She demonstrated good muscular control in therapeutic exercises with use of free weights in lieu of bar weights. Continued mobilizations to enhance joint mobility. Patient understands she would most benefit from continued strengthening to enhance functional outcomes.  Evaluation/Treatment Tolerance: Patient tolerated treatment well    The patient will continue to benefit from skilled outpatient occupational therapy in order to address the deficits listed in the problem list on the initial evaluation, provide patient and family education, and maximize the patients level of independence in the home and community environments.     The patient's spiritual, cultural, and educational needs were considered, and the patient is agreeable to the plan of care and goals.     Education  Education was done with Patient. The patient's learning style includes Listening and Demonstration. The patient Demonstrates understanding and Verbalizes understanding.                 Plan: increase strengthening as tolerated    Goals:   Active       long-term       Patient will improve status score on FOTO by 10% to demonstrate an increase in self-perceived functional performance.  (Met)       Start:  05/21/25    Expected End:  07/16/25    Resolved:  06/10/25         patient will achieve left shoulder range of motion WFL needed to perform ADL/IADL tasks with enhanced independence. (Progressing)       Start:  05/21/25    Expected End:  07/16/25            patient will achieve LUE strength WFL needed to engage in ADL/IADL tasks  such as putting away groceries. (Progressing)       Start:  05/21/25    Expected End:  07/16/25              Resolved       short-term       Patient to be independent with home exercise  program as issued by Occupational Therapist, noted through return demonstration to increase clinical carry over.  (Met)       Start:  05/21/25    Expected End:  06/18/25    Resolved:  05/23/25         patient will demonstrate 10 degree increase in left shoulder overhead motion needed to engage in ADLs such as grooming, dressing, and bathing with enhanced independence. (Met)       Start:  05/21/25    Expected End:  06/18/25    Resolved:  06/19/25         patient will report 3 point reduction in average left shoulder pain to 2/10 or less during functional or therapeutic task performance. (Met)       Start:  05/21/25    Expected End:  07/16/25    Resolved:  06/19/25             Jossy Gann, OT

## 2025-07-14 ENCOUNTER — CLINICAL SUPPORT (OUTPATIENT)
Dept: REHABILITATION | Facility: HOSPITAL | Age: 85
End: 2025-07-14
Payer: MEDICARE

## 2025-07-14 DIAGNOSIS — R53.1 DECREASED RANGE OF MOTION WITH DECREASED STRENGTH: Primary | ICD-10-CM

## 2025-07-14 DIAGNOSIS — M25.60 DECREASED RANGE OF MOTION WITH DECREASED STRENGTH: Primary | ICD-10-CM

## 2025-07-14 DIAGNOSIS — R52 PAIN AGGRAVATED BY ACTIVITIES OF DAILY LIVING: ICD-10-CM

## 2025-07-14 PROCEDURE — 97140 MANUAL THERAPY 1/> REGIONS: CPT | Mod: PN

## 2025-07-14 PROCEDURE — 97110 THERAPEUTIC EXERCISES: CPT | Mod: PN

## 2025-07-14 NOTE — PROGRESS NOTES
Outpatient Rehab    Occupational Therapy Visit    Patient Name: Stacie Hudson  MRN: [id number]  YOB: 1940  Encounter Date: 7/14/2025    Therapy Diagnosis:   Encounter Diagnoses   Name Primary?    Decreased range of motion with decreased strength Yes    Pain aggravated by activities of daily living      Physician: Reynaldo Chavarria MD    Physician Orders: Eval and Treat  Medical Diagnosis: Left shoulder pain  Surgical Diagnosis: left RTS ORIF   Surgical Date: 4/7/2025  Days Since Last Surgery: 98    Visit # / Visits Authorized: 11 / 20  Insurance Authorization Period: 5/22/2025 to 12/31/2025  Date of Evaluation: 5/21/2025  Plan of Care Certification: 5/21/2025 to 8/21/2025      Time In: 1428   Time Out: 1523  Total Time (in minutes): 55   Total Billable Time (in minutes): 54    FOTO:  Intake Score: 54%  Survey Score 2: 65%  Survey Score 3: 61%    Precautions:  Strengthening Precautions: light household chores, no strenuous or forceful lifting, pushing, or pulling using LUE    Subjective   Pt reported feeling well today..  Pain reported as 0/10. left shoulder    Objective            Treatment:  Therapeutic Exercise  TE 1: UBE at level 3 for 4 min forward, 4 min backward  TE 2: biceps curls using 3#, 3x10  TE 3: shoulder IR using black tb, shoulder ER using black tb, and scapular rows using black tb tb, 3x10 each  TE 4: seated shoulder flexion using 1#, 3x10; seated scaption using 1# for 3x10  TE 5: prone shoulder extension using 3#, T's using 2#, 3x10 each  TE 6: supine bench press, serratus punches, triceps extensions using 3#, 3x10  TE 7: supine flies using yellow tb, 3x10  TE 9: supine shoulder flexion using 1#, 3x10  TE 10: rainbow arc to promote overhead shoulder ROM from R to L and L to R, 2x12 clips each wearing 3# wrist weight; wall slides, 3x10  Manual Therapy  MT 1: OT performed PROM left shoulder flexion, scaption, abduction, adducted ER, and abducted at 90 degree IR for 1 min hold, 3 reps  each    Time Entry(in minutes):  Manual Therapy Time Entry: 15  Therapeutic Exercise Time Entry: 39    Assessment & Plan   Assessment: Patient performed all therapeutic exercises well with good form. Her strength is improving as evidenced by use of 3# wrist weights in rainbow arc and therFlavourlyd exericses. She would benefit from progressing resistance in other exercises as tolerated. Continued mobilizations to progress AROM/passive range of motion as tolerated.  Evaluation/Treatment Tolerance: Patient tolerated treatment well    The patient will continue to benefit from skilled outpatient occupational therapy in order to address the deficits listed in the problem list on the initial evaluation, provide patient and family education, and maximize the patients level of independence in the home and community environments.     The patient's spiritual, cultural, and educational needs were considered, and the patient is agreeable to the plan of care and goals.     Education  Education was done with Patient. The patient's learning style includes Listening and Demonstration. The patient Demonstrates understanding and Verbalizes understanding.                 Plan: increase strengthening as tolerated    Goals:   Active       long-term       Patient will improve status score on FOTO by 10% to demonstrate an increase in self-perceived functional performance.  (Met)       Start:  05/21/25    Expected End:  07/16/25    Resolved:  06/10/25         patient will achieve left shoulder range of motion WFL needed to perform ADL/IADL tasks with enhanced independence. (Progressing)       Start:  05/21/25    Expected End:  07/16/25            patient will achieve LUE strength WFL needed to engage in ADL/IADL tasks  such as putting away groceries. (Progressing)       Start:  05/21/25    Expected End:  07/16/25              Resolved       short-term       Patient to be independent with home exercise program as issued by Occupational  Therapist, noted through return demonstration to increase clinical carry over.  (Met)       Start:  05/21/25    Expected End:  06/18/25    Resolved:  05/23/25         patient will demonstrate 10 degree increase in left shoulder overhead motion needed to engage in ADLs such as grooming, dressing, and bathing with enhanced independence. (Met)       Start:  05/21/25    Expected End:  06/18/25    Resolved:  06/19/25         patient will report 3 point reduction in average left shoulder pain to 2/10 or less during functional or therapeutic task performance. (Met)       Start:  05/21/25    Expected End:  07/16/25    Resolved:  06/19/25             Jossy Gann, OT

## 2025-07-16 ENCOUNTER — CLINICAL SUPPORT (OUTPATIENT)
Dept: REHABILITATION | Facility: HOSPITAL | Age: 85
End: 2025-07-16
Payer: MEDICARE

## 2025-07-16 DIAGNOSIS — R53.1 DECREASED RANGE OF MOTION WITH DECREASED STRENGTH: Primary | ICD-10-CM

## 2025-07-16 DIAGNOSIS — M25.60 DECREASED RANGE OF MOTION WITH DECREASED STRENGTH: Primary | ICD-10-CM

## 2025-07-16 DIAGNOSIS — R52 PAIN AGGRAVATED BY ACTIVITIES OF DAILY LIVING: ICD-10-CM

## 2025-07-16 PROCEDURE — 97140 MANUAL THERAPY 1/> REGIONS: CPT | Mod: PN

## 2025-07-16 PROCEDURE — 97110 THERAPEUTIC EXERCISES: CPT | Mod: PN

## 2025-07-16 NOTE — PROGRESS NOTES
Outpatient Rehab    Occupational Therapy Progress Note    Patient Name: Stacie Hudson  MRN: [id number]  YOB: 1940  Encounter Date: 7/16/2025    Therapy Diagnosis:   Encounter Diagnoses   Name Primary?    Decreased range of motion with decreased strength Yes    Pain aggravated by activities of daily living      Physician: Reynaldo Chavarria MD    Physician Orders: Eval and Treat  Medical Diagnosis: Left shoulder pain  Surgical Diagnosis: left RTS ORIF   Surgical Date: 4/7/2025  Days Since Last Surgery: 100    Visit # / Visits Authorized: 12 / 20  Insurance Authorization Period: 5/22/2025 to 12/31/2025  Date of Evaluation: 5/21/2025  Plan of Care Certification: 5/21/2025 to 8/21/2025      Time In: 1230   Time Out: 1324  Total Time (in minutes): 54   Total Billable Time (in minutes): 54    FOTO:  Intake Score: 54%  Survey Score 2: 65%  Survey Score 3: 61%    Precautions:  Strengthening Precautions: light household chores, no strenuous or forceful lifting, pushing, or pulling using LUE    Subjective   Pt reported some mild soreness on this date..  Pain reported as 1/10. left shoulder    Objective      Shoulder Range of Motion  Left Shoulder   Active (deg) Passive (deg) Pain   Flexion 125       Extension 70       Scaption         ABduction 125       ADduction         Horizontal ABduction         Horizontal ADduction         External Rotation (Shoulder ABducted 0 degrees) 54       External Rotation (Shoulder ABducted 45 degrees)         External Rotation (Shoulder ABducted 90 degrees)         Internal Rotation (Shoulder ABducted 0 degrees)  (T12)       Internal Rotation (Shoulder ABducted 45 degrees)         Internal Rotation (Shoulder ABducted 90 degrees)                       Shoulder Strength - Planes of Motion   Right Strength Right Pain Left Strength Left  Pain   Flexion     4+     Extension     4+     ABduction     4     ADduction     4+     Horizontal ABduction     4+     Horizontal ADduction     4+      Internal Rotation 0°     4+     Internal Rotation 90°     4+     External Rotation 0°     4+     External Rotation 90°     4+                    COMPARED TO LAST PROGRESS NOTE  Shoulder Range of Motion  Left Shoulder    Active (deg) Passive (deg) Pain   Flexion 115       Extension 60       Scaption         ABduction 120       ADduction         Horizontal ABduction         Horizontal ADduction         External Rotation (Shoulder ABducted 0 degrees) 40       External Rotation (Shoulder ABducted 45 degrees)         External Rotation (Shoulder ABducted 90 degrees)         Internal Rotation (Shoulder ABducted 0 degrees)  (T12)       Internal Rotation (Shoulder ABducted 45 degrees)         Internal Rotation (Shoulder ABducted 90 degrees)                           Shoulder Strength - Planes of Motion    Right Strength Right Pain Left Strength Left  Pain   Flexion 4+   4     Extension 4+   4     ABduction 4+   4     ADduction 4+   4     Horizontal ABduction 4+   4     Horizontal ADduction 4+   4     Internal Rotation 0° 4+   4     Internal Rotation 90° 4+   4     External Rotation 0° 4+   4     External Rotation 90° 4+   4                    COMPARED TO EVALUATION     Shoulder Range of Motion  Right Shoulder    Active (deg) Passive (deg) Pain   Flexion 150       Extension 60       Scaption         ABduction 120       ADduction         Horizontal ABduction         Horizontal ADduction         External Rotation (Shoulder ABducted 0 degrees) 40       External Rotation (Shoulder ABducted 45 degrees)         External Rotation (Shoulder ABducted 90 degrees)         Internal Rotation (Shoulder ABducted 0 degrees)         Internal Rotation (Shoulder ABducted 45 degrees)         Internal Rotation (Shoulder ABducted 90 degrees)            Left Shoulder    Active (deg) Passive (deg) Pain   Flexion 90   Yes   Extension 50       Scaption         ABduction 90   Yes   ADduction         Horizontal ABduction         Horizontal ADduction          External Rotation (Shoulder ABducted 0 degrees) 25       External Rotation (Shoulder ABducted 45 degrees)         External Rotation (Shoulder ABducted 90 degrees)         Internal Rotation (Shoulder ABducted 0 degrees)         Internal Rotation (Shoulder ABducted 45 degrees)         Internal Rotation (Shoulder ABducted 90 degrees)                     Elbow/Forearm Range of Motion   Left Elbow/Forearm    Active (deg) Passive (deg) Pain   Flexion  (WNL)       Extension  (WNL)       Forearm Pronation         Forearm Supination                           Shoulder Strength - Planes of Motion    Right Strength Right Pain Left Strength Left  Pain   Flexion 4+         Extension 4+         ABduction 4+         ADduction 4+         Horizontal ABduction 4+         Horizontal ADduction 4+         Internal Rotation 0° 4+         Internal Rotation 90° 4+         External Rotation 0° 4+         External Rotation 90° 4+               Shoulder Strength Details  Left not tested secondary to postoperative status     Elbow Strength    Right Strength Right Pain Left Strength Left  Pain   Flexion (C6) 4+   4+     Extension (C7) 4+   4+           Forearm Strength    Right Strength Right Pain Left Strength Left  Pain   Pronation 4+   4+     Supination 4+   4+           Right  Strength  Right Hand Dynamometer Position: 2  Elbow Position Forearm Position Trial 1 (lbs) Trial 2  (lbs) Trial 3  (lbs) Average  (lbs) Pain   Flexed Neutral 25 30 35 30           Left  Strength  Left Hand Dynamometer Position: 2  Elbow Position Forearm Position Trial 1 (lbs) Trial 2 (lbs) Trial 3 (lbs) Average (lbs) Pain   Flexed Neutral 23 24 25 24        Treatment:  Therapeutic Exercise  TE 1: UBE at level 3 for 5 min forward  TE 2: biceps curls using 3#, 3x10  TE 3: shoulder IR using black tb, shoulder ER using black tb, and scapular rows using black tb tb, 3x10 each  TE 4: seated shoulder flexion using 1#, 3x10; seated scaption using 1# for 3x10  TE  5: prone shoulder extension using 3#, T's using 2#, 3x10 each  TE 6: supine bench press, serratus punches, triceps extensions using 3#, 3x10  TE 7: supine flies using yellow tb, 3x10  TE 8: sidelying ER, 3x10  TE 9: supine shoulder flexion using 1#, 3x10  TE 10: rainbow arc to promote overhead shoulder ROM from R to L and L to R, 1x12 clips each wearing 3# wrist weight; wall slides, 3x10  Manual Therapy  MT 1: OT performed PROM left shoulder flexion, scaption, abduction, adducted ER, and abducted at 90 degree IR for 1 min hold, 4 reps each    Time Entry(in minutes):  Manual Therapy Time Entry: 20  Therapeutic Exercise Time Entry: 34    Assessment & Plan   Assessment: Patient reported feeling increased muscular soreness on this date. She requested decreased duration on UBE as well as decreased repetition on rainbow arc secondary to muscular fatigue. She performed all therapeutic exercises well with good form using weighted resistance. Increased mobilization repetitions to enhance joint mobility and relieve muscular tightness with positive report. Upon assessment, patient made great gains in functional range of motion and strength. She is appropriate for discharge with home exercise program next session.  Evaluation/Treatment Tolerance: Patient tolerated treatment well    The patient will continue to benefit from skilled outpatient occupational therapy in order to address the deficits listed in the problem list on the initial evaluation, provide patient and family education, and maximize the patients level of independence in the home and community environments.     The patient's spiritual, cultural, and educational needs were considered, and the patient is agreeable to the plan of care and goals.     Education  Education was done with Patient. The patient's learning style includes Listening and Demonstration. The patient Demonstrates understanding and Verbalizes understanding.                 Plan: discharge with [facility]  next session    Goals:   Active       long-term       Patient will improve status score on FOTO by 10% to demonstrate an increase in self-perceived functional performance.  (Met)       Start:  05/21/25    Expected End:  07/16/25    Resolved:  06/10/25         patient will achieve left shoulder range of motion WFL needed to perform ADL/IADL tasks with enhanced independence. (Progressing)       Start:  05/21/25    Expected End:  08/01/25            patient will achieve LUE strength WFL needed to engage in ADL/IADL tasks  such as putting away groceries. (Progressing)       Start:  05/21/25    Expected End:  08/01/25              Resolved       short-term       Patient to be independent with home exercise program as issued by Occupational Therapist, noted through return demonstration to increase clinical carry over.  (Met)       Start:  05/21/25    Expected End:  06/18/25    Resolved:  05/23/25         patient will demonstrate 10 degree increase in left shoulder overhead motion needed to engage in ADLs such as grooming, dressing, and bathing with enhanced independence. (Met)       Start:  05/21/25    Expected End:  06/18/25    Resolved:  06/19/25         patient will report 3 point reduction in average left shoulder pain to 2/10 or less during functional or therapeutic task performance. (Met)       Start:  05/21/25    Expected End:  07/16/25    Resolved:  06/19/25             Jossy Gann OT